# Patient Record
Sex: FEMALE | Race: WHITE
[De-identification: names, ages, dates, MRNs, and addresses within clinical notes are randomized per-mention and may not be internally consistent; named-entity substitution may affect disease eponyms.]

---

## 2022-05-24 ENCOUNTER — HOSPITAL ENCOUNTER (OUTPATIENT)
Dept: HOSPITAL 95 - LAB | Age: 87
Discharge: HOME | End: 2022-05-24
Attending: PHYSICIAN ASSISTANT
Payer: MEDICARE

## 2022-05-24 DIAGNOSIS — I10: Primary | ICD-10-CM

## 2022-05-24 LAB
ANION GAP SERPL CALCULATED.4IONS-SCNC: 6 MMOL/L (ref 6–16)
BUN SERPL-MCNC: 31 MG/DL (ref 8–24)
CALCIUM SERPL-MCNC: 9.4 MG/DL (ref 8.5–10.1)
CHLORIDE SERPL-SCNC: 100 MMOL/L (ref 98–108)
CO2 SERPL-SCNC: 31 MMOL/L (ref 21–32)
CREAT SERPL-MCNC: 0.84 MG/DL (ref 0.4–1)
GLUCOSE SERPL-MCNC: 101 MG/DL (ref 70–99)
POTASSIUM SERPL-SCNC: 3.9 MMOL/L (ref 3.5–5.5)
SODIUM SERPL-SCNC: 137 MMOL/L (ref 136–145)

## 2023-05-22 ENCOUNTER — HOSPITAL ENCOUNTER (INPATIENT)
Dept: HOSPITAL 95 - ER | Age: 88
LOS: 8 days | Discharge: HOME HEALTH | DRG: 100 | End: 2023-05-30
Attending: HOSPITALIST | Admitting: HOSPITALIST
Payer: COMMERCIAL

## 2023-05-22 VITALS — HEIGHT: 62 IN | WEIGHT: 111.55 LBS | BODY MASS INDEX: 20.53 KG/M2

## 2023-05-22 DIAGNOSIS — F17.210: ICD-10-CM

## 2023-05-22 DIAGNOSIS — Z66: ICD-10-CM

## 2023-05-22 DIAGNOSIS — F32.9: ICD-10-CM

## 2023-05-22 DIAGNOSIS — M81.0: ICD-10-CM

## 2023-05-22 DIAGNOSIS — M54.9: ICD-10-CM

## 2023-05-22 DIAGNOSIS — I65.21: ICD-10-CM

## 2023-05-22 DIAGNOSIS — G93.89: ICD-10-CM

## 2023-05-22 DIAGNOSIS — J96.90: ICD-10-CM

## 2023-05-22 DIAGNOSIS — D72.829: ICD-10-CM

## 2023-05-22 DIAGNOSIS — I67.9: ICD-10-CM

## 2023-05-22 DIAGNOSIS — R56.9: Primary | ICD-10-CM

## 2023-05-22 DIAGNOSIS — I65.02: ICD-10-CM

## 2023-05-22 DIAGNOSIS — I10: ICD-10-CM

## 2023-05-22 DIAGNOSIS — G89.4: ICD-10-CM

## 2023-05-22 DIAGNOSIS — F10.20: ICD-10-CM

## 2023-05-22 DIAGNOSIS — Z51.5: ICD-10-CM

## 2023-05-22 DIAGNOSIS — F11.11: ICD-10-CM

## 2023-05-22 DIAGNOSIS — Z79.899: ICD-10-CM

## 2023-05-22 DIAGNOSIS — F01.50: ICD-10-CM

## 2023-05-22 LAB
ALBUMIN SERPL BCP-MCNC: 4 G/DL (ref 3.4–5)
ALBUMIN/GLOB SERPL: 1.1 {RATIO} (ref 0.8–1.8)
ALT SERPL W P-5'-P-CCNC: 24 U/L (ref 12–78)
ANION GAP SERPL CALCULATED.4IONS-SCNC: 10 MMOL/L (ref 6–16)
AST SERPL W P-5'-P-CCNC: 33 U/L (ref 12–37)
BASOPHILS # BLD: 0.2 K/MM3 (ref 0–0.23)
BASOPHILS NFR BLD: 1 % (ref 0–2)
BILIRUB SERPL-MCNC: 0.5 MG/DL (ref 0.1–1)
BUN SERPL-MCNC: 14 MG/DL (ref 8–24)
CALCIUM SERPL-MCNC: 9.9 MG/DL (ref 8.5–10.1)
CANNABINOIDS UR QL: DETECTED
CHLORIDE SERPL-SCNC: 104 MMOL/L (ref 98–108)
CO2 SERPL-SCNC: 23 MMOL/L (ref 21–32)
CREAT SERPL-MCNC: 0.87 MG/DL (ref 0.4–1)
DEPRECATED RDW RBC AUTO: 42.6 FL (ref 35.1–46.3)
EOSINOPHIL # BLD: 0 K/MM3 (ref 0–0.68)
EOSINOPHIL NFR BLD: 0 % (ref 0–6)
ERYTHROCYTE [DISTWIDTH] IN BLOOD BY AUTOMATED COUNT: 12.2 % (ref 11.7–14.2)
GLOBULIN SER CALC-MCNC: 3.6 G/DL (ref 2.2–4)
GLUCOSE SERPL-MCNC: 140 MG/DL (ref 70–99)
HCT VFR BLD AUTO: 45.5 % (ref 33–51)
HGB BLD-MCNC: 14.7 G/DL (ref 11.5–16)
KETONES UR STRIP-MCNC: (no result) MG/DL
LYMPHOCYTES # BLD: 15.36 K/MM3 (ref 0.84–5.2)
LYMPHOCYTES NFR BLD: 73 % (ref 21–46)
MCHC RBC AUTO-ENTMCNC: 32.3 G/DL (ref 31.5–36.5)
MCV RBC AUTO: 95 FL (ref 80–100)
MONOCYTES # BLD: 0.83 K/MM3 (ref 0.16–1.47)
MONOCYTES NFR BLD: 4 % (ref 4–13)
NEUTS SEG # BLD MANUAL: 4.36 K/MM3 (ref 1.96–9.15)
NEUTS SEG NFR BLD MANUAL: 21 % (ref 41–73)
NRBC # BLD AUTO: 0 K/MM3 (ref 0–0.02)
NRBC BLD AUTO-RTO: 0 /100 WBC (ref 0–0.2)
PH BLDV: 7.4 [PH] (ref 7.34–7.37)
PLATELET # BLD AUTO: 336 K/MM3 (ref 150–400)
POTASSIUM SERPL-SCNC: 4.4 MMOL/L (ref 3.5–5.5)
PROT SERPL-MCNC: 7.6 G/DL (ref 6.4–8.2)
PROT UR STRIP-MCNC: (no result) MG/DL
SODIUM SERPL-SCNC: 137 MMOL/L (ref 136–145)
SP GR SPEC: 1.02 (ref 1–1.02)
TOTAL CELLS COUNTED BLD: 100
URN SPEC COLLECT METH UR: (no result)
UROBILINOGEN UR STRIP-MCNC: (no result) MG/DL
VARIANT LYMPHS NFR BLD MANUAL: 1 % (ref 0–0)

## 2023-05-22 PROCEDURE — 5A1945Z RESPIRATORY VENTILATION, 24-96 CONSECUTIVE HOURS: ICD-10-PCS | Performed by: HOSPITALIST

## 2023-05-22 PROCEDURE — A9270 NON-COVERED ITEM OR SERVICE: HCPCS

## 2023-05-22 PROCEDURE — 0T9B70Z DRAINAGE OF BLADDER WITH DRAINAGE DEVICE, VIA NATURAL OR ARTIFICIAL OPENING: ICD-10-PCS | Performed by: HOSPITALIST

## 2023-05-22 PROCEDURE — C9113 INJ PANTOPRAZOLE SODIUM, VIA: HCPCS

## 2023-05-22 PROCEDURE — 0DH67UZ INSERTION OF FEEDING DEVICE INTO STOMACH, VIA NATURAL OR ARTIFICIAL OPENING: ICD-10-PCS

## 2023-05-22 PROCEDURE — 0BH17EZ INSERTION OF ENDOTRACHEAL AIRWAY INTO TRACHEA, VIA NATURAL OR ARTIFICIAL OPENING: ICD-10-PCS

## 2023-05-23 VITALS — SYSTOLIC BLOOD PRESSURE: 122 MMHG | DIASTOLIC BLOOD PRESSURE: 68 MMHG

## 2023-05-23 VITALS — SYSTOLIC BLOOD PRESSURE: 151 MMHG | DIASTOLIC BLOOD PRESSURE: 68 MMHG

## 2023-05-23 VITALS — DIASTOLIC BLOOD PRESSURE: 86 MMHG | SYSTOLIC BLOOD PRESSURE: 142 MMHG

## 2023-05-23 VITALS — SYSTOLIC BLOOD PRESSURE: 172 MMHG | DIASTOLIC BLOOD PRESSURE: 88 MMHG

## 2023-05-23 VITALS — SYSTOLIC BLOOD PRESSURE: 148 MMHG | DIASTOLIC BLOOD PRESSURE: 70 MMHG

## 2023-05-23 VITALS — DIASTOLIC BLOOD PRESSURE: 118 MMHG | SYSTOLIC BLOOD PRESSURE: 143 MMHG

## 2023-05-23 VITALS — DIASTOLIC BLOOD PRESSURE: 62 MMHG | SYSTOLIC BLOOD PRESSURE: 154 MMHG

## 2023-05-23 VITALS — SYSTOLIC BLOOD PRESSURE: 135 MMHG | DIASTOLIC BLOOD PRESSURE: 70 MMHG

## 2023-05-23 VITALS — SYSTOLIC BLOOD PRESSURE: 133 MMHG | DIASTOLIC BLOOD PRESSURE: 82 MMHG

## 2023-05-23 VITALS — SYSTOLIC BLOOD PRESSURE: 121 MMHG | DIASTOLIC BLOOD PRESSURE: 61 MMHG

## 2023-05-23 VITALS — DIASTOLIC BLOOD PRESSURE: 73 MMHG | SYSTOLIC BLOOD PRESSURE: 151 MMHG

## 2023-05-23 VITALS — SYSTOLIC BLOOD PRESSURE: 134 MMHG | DIASTOLIC BLOOD PRESSURE: 87 MMHG

## 2023-05-23 VITALS — SYSTOLIC BLOOD PRESSURE: 154 MMHG | DIASTOLIC BLOOD PRESSURE: 62 MMHG

## 2023-05-23 VITALS — SYSTOLIC BLOOD PRESSURE: 147 MMHG | DIASTOLIC BLOOD PRESSURE: 74 MMHG

## 2023-05-23 VITALS — DIASTOLIC BLOOD PRESSURE: 64 MMHG | SYSTOLIC BLOOD PRESSURE: 137 MMHG

## 2023-05-23 VITALS — DIASTOLIC BLOOD PRESSURE: 64 MMHG | SYSTOLIC BLOOD PRESSURE: 151 MMHG

## 2023-05-23 VITALS — DIASTOLIC BLOOD PRESSURE: 70 MMHG | SYSTOLIC BLOOD PRESSURE: 102 MMHG

## 2023-05-23 VITALS — SYSTOLIC BLOOD PRESSURE: 113 MMHG | DIASTOLIC BLOOD PRESSURE: 68 MMHG

## 2023-05-23 VITALS — SYSTOLIC BLOOD PRESSURE: 149 MMHG | DIASTOLIC BLOOD PRESSURE: 88 MMHG

## 2023-05-23 VITALS — SYSTOLIC BLOOD PRESSURE: 160 MMHG | DIASTOLIC BLOOD PRESSURE: 75 MMHG

## 2023-05-23 VITALS — DIASTOLIC BLOOD PRESSURE: 57 MMHG | SYSTOLIC BLOOD PRESSURE: 140 MMHG

## 2023-05-23 VITALS — DIASTOLIC BLOOD PRESSURE: 81 MMHG | SYSTOLIC BLOOD PRESSURE: 199 MMHG

## 2023-05-23 VITALS — DIASTOLIC BLOOD PRESSURE: 77 MMHG | SYSTOLIC BLOOD PRESSURE: 140 MMHG

## 2023-05-23 VITALS — SYSTOLIC BLOOD PRESSURE: 124 MMHG | DIASTOLIC BLOOD PRESSURE: 71 MMHG

## 2023-05-23 VITALS — DIASTOLIC BLOOD PRESSURE: 75 MMHG | SYSTOLIC BLOOD PRESSURE: 220 MMHG

## 2023-05-23 VITALS — DIASTOLIC BLOOD PRESSURE: 72 MMHG | SYSTOLIC BLOOD PRESSURE: 160 MMHG

## 2023-05-23 VITALS — DIASTOLIC BLOOD PRESSURE: 80 MMHG | SYSTOLIC BLOOD PRESSURE: 134 MMHG

## 2023-05-23 VITALS — SYSTOLIC BLOOD PRESSURE: 130 MMHG | DIASTOLIC BLOOD PRESSURE: 55 MMHG

## 2023-05-23 VITALS — DIASTOLIC BLOOD PRESSURE: 69 MMHG | SYSTOLIC BLOOD PRESSURE: 142 MMHG

## 2023-05-23 VITALS — SYSTOLIC BLOOD PRESSURE: 153 MMHG | DIASTOLIC BLOOD PRESSURE: 67 MMHG

## 2023-05-23 VITALS — DIASTOLIC BLOOD PRESSURE: 84 MMHG | SYSTOLIC BLOOD PRESSURE: 146 MMHG

## 2023-05-23 VITALS — DIASTOLIC BLOOD PRESSURE: 69 MMHG | SYSTOLIC BLOOD PRESSURE: 150 MMHG

## 2023-05-23 VITALS — SYSTOLIC BLOOD PRESSURE: 220 MMHG | DIASTOLIC BLOOD PRESSURE: 75 MMHG

## 2023-05-23 VITALS — DIASTOLIC BLOOD PRESSURE: 73 MMHG | SYSTOLIC BLOOD PRESSURE: 149 MMHG

## 2023-05-23 VITALS — SYSTOLIC BLOOD PRESSURE: 119 MMHG | DIASTOLIC BLOOD PRESSURE: 102 MMHG

## 2023-05-23 VITALS — SYSTOLIC BLOOD PRESSURE: 143 MMHG | DIASTOLIC BLOOD PRESSURE: 96 MMHG

## 2023-05-23 VITALS — DIASTOLIC BLOOD PRESSURE: 86 MMHG | SYSTOLIC BLOOD PRESSURE: 155 MMHG

## 2023-05-23 VITALS — DIASTOLIC BLOOD PRESSURE: 60 MMHG | SYSTOLIC BLOOD PRESSURE: 109 MMHG

## 2023-05-23 VITALS — SYSTOLIC BLOOD PRESSURE: 205 MMHG | DIASTOLIC BLOOD PRESSURE: 74 MMHG

## 2023-05-23 VITALS — DIASTOLIC BLOOD PRESSURE: 83 MMHG | SYSTOLIC BLOOD PRESSURE: 154 MMHG

## 2023-05-23 VITALS — DIASTOLIC BLOOD PRESSURE: 65 MMHG | SYSTOLIC BLOOD PRESSURE: 126 MMHG

## 2023-05-23 VITALS — SYSTOLIC BLOOD PRESSURE: 149 MMHG | DIASTOLIC BLOOD PRESSURE: 86 MMHG

## 2023-05-23 VITALS — SYSTOLIC BLOOD PRESSURE: 129 MMHG | DIASTOLIC BLOOD PRESSURE: 64 MMHG

## 2023-05-23 VITALS — SYSTOLIC BLOOD PRESSURE: 121 MMHG | DIASTOLIC BLOOD PRESSURE: 63 MMHG

## 2023-05-23 VITALS — SYSTOLIC BLOOD PRESSURE: 112 MMHG | DIASTOLIC BLOOD PRESSURE: 66 MMHG

## 2023-05-23 VITALS — DIASTOLIC BLOOD PRESSURE: 63 MMHG | SYSTOLIC BLOOD PRESSURE: 124 MMHG

## 2023-05-23 VITALS — SYSTOLIC BLOOD PRESSURE: 141 MMHG | DIASTOLIC BLOOD PRESSURE: 62 MMHG

## 2023-05-23 VITALS — DIASTOLIC BLOOD PRESSURE: 89 MMHG | SYSTOLIC BLOOD PRESSURE: 143 MMHG

## 2023-05-23 VITALS — SYSTOLIC BLOOD PRESSURE: 142 MMHG | DIASTOLIC BLOOD PRESSURE: 58 MMHG

## 2023-05-23 VITALS — SYSTOLIC BLOOD PRESSURE: 142 MMHG | DIASTOLIC BLOOD PRESSURE: 78 MMHG

## 2023-05-23 VITALS — DIASTOLIC BLOOD PRESSURE: 82 MMHG | SYSTOLIC BLOOD PRESSURE: 166 MMHG

## 2023-05-23 VITALS — DIASTOLIC BLOOD PRESSURE: 70 MMHG | SYSTOLIC BLOOD PRESSURE: 115 MMHG

## 2023-05-23 VITALS — SYSTOLIC BLOOD PRESSURE: 137 MMHG | DIASTOLIC BLOOD PRESSURE: 76 MMHG

## 2023-05-23 VITALS — SYSTOLIC BLOOD PRESSURE: 117 MMHG | DIASTOLIC BLOOD PRESSURE: 66 MMHG

## 2023-05-23 VITALS — DIASTOLIC BLOOD PRESSURE: 88 MMHG | SYSTOLIC BLOOD PRESSURE: 149 MMHG

## 2023-05-23 VITALS — SYSTOLIC BLOOD PRESSURE: 185 MMHG | DIASTOLIC BLOOD PRESSURE: 75 MMHG

## 2023-05-23 VITALS — DIASTOLIC BLOOD PRESSURE: 102 MMHG | SYSTOLIC BLOOD PRESSURE: 119 MMHG

## 2023-05-23 VITALS — DIASTOLIC BLOOD PRESSURE: 62 MMHG | SYSTOLIC BLOOD PRESSURE: 141 MMHG

## 2023-05-23 VITALS — SYSTOLIC BLOOD PRESSURE: 156 MMHG | DIASTOLIC BLOOD PRESSURE: 96 MMHG

## 2023-05-23 VITALS — SYSTOLIC BLOOD PRESSURE: 139 MMHG | DIASTOLIC BLOOD PRESSURE: 84 MMHG

## 2023-05-23 VITALS — SYSTOLIC BLOOD PRESSURE: 126 MMHG | DIASTOLIC BLOOD PRESSURE: 59 MMHG

## 2023-05-23 VITALS — SYSTOLIC BLOOD PRESSURE: 137 MMHG | DIASTOLIC BLOOD PRESSURE: 55 MMHG

## 2023-05-23 VITALS — DIASTOLIC BLOOD PRESSURE: 62 MMHG | SYSTOLIC BLOOD PRESSURE: 147 MMHG

## 2023-05-23 VITALS — DIASTOLIC BLOOD PRESSURE: 83 MMHG | SYSTOLIC BLOOD PRESSURE: 134 MMHG

## 2023-05-23 VITALS — DIASTOLIC BLOOD PRESSURE: 66 MMHG | SYSTOLIC BLOOD PRESSURE: 211 MMHG

## 2023-05-23 VITALS — SYSTOLIC BLOOD PRESSURE: 113 MMHG | DIASTOLIC BLOOD PRESSURE: 63 MMHG

## 2023-05-23 VITALS — DIASTOLIC BLOOD PRESSURE: 67 MMHG | SYSTOLIC BLOOD PRESSURE: 121 MMHG

## 2023-05-23 VITALS — DIASTOLIC BLOOD PRESSURE: 63 MMHG | SYSTOLIC BLOOD PRESSURE: 168 MMHG

## 2023-05-23 VITALS — DIASTOLIC BLOOD PRESSURE: 71 MMHG | SYSTOLIC BLOOD PRESSURE: 149 MMHG

## 2023-05-23 VITALS — DIASTOLIC BLOOD PRESSURE: 73 MMHG | SYSTOLIC BLOOD PRESSURE: 133 MMHG

## 2023-05-23 VITALS — DIASTOLIC BLOOD PRESSURE: 67 MMHG | SYSTOLIC BLOOD PRESSURE: 143 MMHG

## 2023-05-23 VITALS — DIASTOLIC BLOOD PRESSURE: 74 MMHG | SYSTOLIC BLOOD PRESSURE: 121 MMHG

## 2023-05-23 VITALS — DIASTOLIC BLOOD PRESSURE: 51 MMHG | SYSTOLIC BLOOD PRESSURE: 97 MMHG

## 2023-05-23 VITALS — SYSTOLIC BLOOD PRESSURE: 136 MMHG | DIASTOLIC BLOOD PRESSURE: 105 MMHG

## 2023-05-23 VITALS — DIASTOLIC BLOOD PRESSURE: 63 MMHG | SYSTOLIC BLOOD PRESSURE: 148 MMHG

## 2023-05-23 VITALS — SYSTOLIC BLOOD PRESSURE: 112 MMHG | DIASTOLIC BLOOD PRESSURE: 54 MMHG

## 2023-05-23 VITALS — DIASTOLIC BLOOD PRESSURE: 74 MMHG | SYSTOLIC BLOOD PRESSURE: 212 MMHG

## 2023-05-23 VITALS — DIASTOLIC BLOOD PRESSURE: 64 MMHG | SYSTOLIC BLOOD PRESSURE: 156 MMHG

## 2023-05-23 VITALS — SYSTOLIC BLOOD PRESSURE: 177 MMHG | DIASTOLIC BLOOD PRESSURE: 66 MMHG

## 2023-05-23 VITALS — DIASTOLIC BLOOD PRESSURE: 78 MMHG | SYSTOLIC BLOOD PRESSURE: 121 MMHG

## 2023-05-23 VITALS — SYSTOLIC BLOOD PRESSURE: 131 MMHG | DIASTOLIC BLOOD PRESSURE: 67 MMHG

## 2023-05-23 LAB
ALBUMIN SERPL BCP-MCNC: 3.7 G/DL (ref 3.4–5)
ALBUMIN/GLOB SERPL: 1.2 {RATIO} (ref 0.8–1.8)
ALT SERPL W P-5'-P-CCNC: 19 U/L (ref 12–78)
ANION GAP SERPL CALCULATED.4IONS-SCNC: 10 MMOL/L (ref 6–16)
AST SERPL W P-5'-P-CCNC: 29 U/L (ref 12–37)
BASOPHILS # BLD: 0.2 K/MM3 (ref 0–0.23)
BASOPHILS NFR BLD: 1 % (ref 0–2)
BILIRUB SERPL-MCNC: 0.5 MG/DL (ref 0.1–1)
BUN SERPL-MCNC: 15 MG/DL (ref 8–24)
CALCIUM SERPL-MCNC: 9.8 MG/DL (ref 8.5–10.1)
CHLORIDE SERPL-SCNC: 102 MMOL/L (ref 98–108)
CO2 SERPL-SCNC: 26 MMOL/L (ref 21–32)
CREAT SERPL-MCNC: 0.8 MG/DL (ref 0.4–1)
DEPRECATED RDW RBC AUTO: 41.1 FL (ref 35.1–46.3)
EOSINOPHIL # BLD: 0 K/MM3 (ref 0–0.68)
EOSINOPHIL NFR BLD: 0 % (ref 0–6)
ERYTHROCYTE [DISTWIDTH] IN BLOOD BY AUTOMATED COUNT: 12.2 % (ref 11.7–14.2)
GLOBULIN SER CALC-MCNC: 3.2 G/DL (ref 2.2–4)
GLUCOSE SERPL-MCNC: 118 MG/DL (ref 70–99)
HCT VFR BLD AUTO: 41.8 % (ref 33–51)
HGB BLD-MCNC: 14.1 G/DL (ref 11.5–16)
LYMPHOCYTES # BLD: 11.9 K/MM3 (ref 0.84–5.2)
LYMPHOCYTES NFR BLD: 57 % (ref 21–46)
MCHC RBC AUTO-ENTMCNC: 33.7 G/DL (ref 31.5–36.5)
MCV RBC AUTO: 92 FL (ref 80–100)
MONOCYTES # BLD: 1.46 K/MM3 (ref 0.16–1.47)
MONOCYTES NFR BLD: 7 % (ref 4–13)
NEUTS BAND NFR BLD MANUAL: 1 % (ref 0–8)
NEUTS SEG # BLD MANUAL: 7.3 K/MM3 (ref 1.96–9.15)
NEUTS SEG NFR BLD MANUAL: 34 % (ref 41–73)
NRBC # BLD AUTO: 0 K/MM3 (ref 0–0.02)
NRBC BLD AUTO-RTO: 0 /100 WBC (ref 0–0.2)
PLATELET # BLD AUTO: 337 K/MM3 (ref 150–400)
POTASSIUM SERPL-SCNC: 3.7 MMOL/L (ref 3.5–5.5)
PROT SERPL-MCNC: 6.9 G/DL (ref 6.4–8.2)
SODIUM SERPL-SCNC: 138 MMOL/L (ref 136–145)
TOTAL CELLS COUNTED BLD: 100

## 2023-05-23 NOTE — NUR
SHIFT SUMMARY
 
PT REMAINS INTUBATED AND SEDATED. NO CHANGES TO VENT SETTINGS. PROPOFOL
CONTINUES @ 30MCG'S. EEG COMPLETED THIS AFTERNOON, AWAITING RESULTS. NO
SEIZURES NOTED TODAY. TEMP PROBE NOLAN DRAINING NOEMÍ URINE. 100ML/HR OF NS
STARTED X 1L. FAMILY AT BEDSIDE THIS AFTERNOON, NOTIFIED THIS NURSE OF PTS
ETOH USAGE, STATED PT HAS NOT HAD A DRINK IN 2 DAYS PRIOR TO SEIZURE. NO OTHER
ACUTE CHANGES TODAY.

## 2023-05-23 NOTE — NUR
PROPOFOL INFILTRATE TO LEFT UPPER ARM. NOTHING OBTAINED WITH DRAWING BACK,
FLUSHED WITH NS. LEFT AC SITE DC'D. BP CUFF MOVED TO RIGHT WRIST. WHILE
PROPOFOL OFF PATIENT ACTIVE IN BED PULLING AWAY FROM PAIN, BRING KNEES UP,
CONTINUES TO NOT OPEN EYES.

## 2023-05-23 NOTE — NUR
PATIENT REMAINS INTUBATED AND SEDATED PROPOFOL 30 MCG, PATIENT KEEPING EYES
CLOSED, PUPILS PINPOINT. WITH SLIGHT STIMULI, MOVING ALL EXTREMITIES RIGHT ARM
MORE THAN LEFT. PATIENT CAN STRAIGHTEN LEGS, BUT LIKES TO BEND KNEES UP TIGHT.
ETT IN PLACE WITH VENT ACVC+ 16, , PEEP 5, FIO2 25% OG IN PLACE AND
CLAMPED. NOLAN DRAINING SMALL AMT OF NOEMÍ URINE.

## 2023-05-23 NOTE — NUR
SUMMARY
PATIENT REMAINS INTUBATED AND SEDATED WITH PROPOFOL 30 MCG. ETT IN PLACE WITH
VENT ACVC+ 16, , PEEP 5, FIO2 25% . PATIENT CONTINUES TO NOT OPEN HER
EYES, NOT FOLLOWING DIRECTIONS. TABARES RIGHT MORE THAN LEFT, INCREASED MOVEMENT
WITH STIMULI. PULLING KNEES UP FREQUENTLY. OG IN PLACE AND CLAMPED.
NICARDIPINE DRIP ON FOR ONLY SHORT TIME TONIGHT. /67.

## 2023-05-23 NOTE — NUR
SHIFT ASSESSMENT
 
PT INTUBATED AND SEDATED, VENT SETTINGS AC-16/350/5/25% c SATS >95%. PROPOFOL
@ 30MCG'S. BL SOFT WRIST RESTRAINTS ON.  PT GIVEN SEDATION VACATION MID
MORNING , DURING THAT TIME PT NOT FOLLOWING COMMANDS BUT MOVING ALL
EXTREMITIES. COUGH AND GAG PRESENT. PUPILS REMAIN PINPOINT. OGT CLAMPED. TEMP
PROBE NOLAN DRAINING YELLOW URINE. NO BM. EEG SCHEDULED FOR THIS AFTERNOON.
WILL  MONITOR CLOSELY.

## 2023-05-24 VITALS — SYSTOLIC BLOOD PRESSURE: 171 MMHG | DIASTOLIC BLOOD PRESSURE: 64 MMHG

## 2023-05-24 VITALS — DIASTOLIC BLOOD PRESSURE: 73 MMHG | SYSTOLIC BLOOD PRESSURE: 184 MMHG

## 2023-05-24 VITALS — SYSTOLIC BLOOD PRESSURE: 129 MMHG | DIASTOLIC BLOOD PRESSURE: 68 MMHG

## 2023-05-24 VITALS — DIASTOLIC BLOOD PRESSURE: 76 MMHG | SYSTOLIC BLOOD PRESSURE: 133 MMHG

## 2023-05-24 VITALS — SYSTOLIC BLOOD PRESSURE: 169 MMHG | DIASTOLIC BLOOD PRESSURE: 68 MMHG

## 2023-05-24 VITALS — SYSTOLIC BLOOD PRESSURE: 184 MMHG | DIASTOLIC BLOOD PRESSURE: 84 MMHG

## 2023-05-24 VITALS — DIASTOLIC BLOOD PRESSURE: 90 MMHG | SYSTOLIC BLOOD PRESSURE: 161 MMHG

## 2023-05-24 VITALS — SYSTOLIC BLOOD PRESSURE: 172 MMHG | DIASTOLIC BLOOD PRESSURE: 74 MMHG

## 2023-05-24 VITALS — SYSTOLIC BLOOD PRESSURE: 104 MMHG | DIASTOLIC BLOOD PRESSURE: 70 MMHG

## 2023-05-24 VITALS — SYSTOLIC BLOOD PRESSURE: 177 MMHG | DIASTOLIC BLOOD PRESSURE: 69 MMHG

## 2023-05-24 VITALS — DIASTOLIC BLOOD PRESSURE: 69 MMHG | SYSTOLIC BLOOD PRESSURE: 167 MMHG

## 2023-05-24 VITALS — DIASTOLIC BLOOD PRESSURE: 62 MMHG | SYSTOLIC BLOOD PRESSURE: 160 MMHG

## 2023-05-24 VITALS — SYSTOLIC BLOOD PRESSURE: 138 MMHG | DIASTOLIC BLOOD PRESSURE: 105 MMHG

## 2023-05-24 VITALS — DIASTOLIC BLOOD PRESSURE: 67 MMHG | SYSTOLIC BLOOD PRESSURE: 168 MMHG

## 2023-05-24 VITALS — SYSTOLIC BLOOD PRESSURE: 146 MMHG | DIASTOLIC BLOOD PRESSURE: 71 MMHG

## 2023-05-24 VITALS — SYSTOLIC BLOOD PRESSURE: 169 MMHG | DIASTOLIC BLOOD PRESSURE: 64 MMHG

## 2023-05-24 VITALS — DIASTOLIC BLOOD PRESSURE: 56 MMHG | SYSTOLIC BLOOD PRESSURE: 165 MMHG

## 2023-05-24 LAB
ALBUMIN SERPL BCP-MCNC: 3.3 G/DL (ref 3.4–5)
ALBUMIN/GLOB SERPL: 1.1 {RATIO} (ref 0.8–1.8)
ALT SERPL W P-5'-P-CCNC: 27 U/L (ref 12–78)
ANION GAP SERPL CALCULATED.4IONS-SCNC: 10 MMOL/L (ref 6–16)
AST SERPL W P-5'-P-CCNC: 67 U/L (ref 12–37)
BASOPHILS # BLD AUTO: 0.07 K/MM3 (ref 0–0.23)
BASOPHILS NFR BLD AUTO: 0 % (ref 0–2)
BILIRUB SERPL-MCNC: 0.9 MG/DL (ref 0.1–1)
BUN SERPL-MCNC: 18 MG/DL (ref 8–24)
CALCIUM SERPL-MCNC: 9.1 MG/DL (ref 8.5–10.1)
CHLORIDE SERPL-SCNC: 107 MMOL/L (ref 98–108)
CO2 SERPL-SCNC: 24 MMOL/L (ref 21–32)
CREAT SERPL-MCNC: 0.74 MG/DL (ref 0.4–1)
DEPRECATED RDW RBC AUTO: 40.1 FL (ref 35.1–46.3)
EOSINOPHIL # BLD AUTO: 0.06 K/MM3 (ref 0–0.68)
EOSINOPHIL NFR BLD AUTO: 0 % (ref 0–6)
ERYTHROCYTE [DISTWIDTH] IN BLOOD BY AUTOMATED COUNT: 12.2 % (ref 11.7–14.2)
GLOBULIN SER CALC-MCNC: 3 G/DL (ref 2.2–4)
GLUCOSE SERPL-MCNC: 74 MG/DL (ref 70–99)
HCT VFR BLD AUTO: 39.4 % (ref 33–51)
HGB BLD-MCNC: 13.5 G/DL (ref 11.5–16)
IMM GRANULOCYTES # BLD AUTO: 0.07 K/MM3 (ref 0–0.1)
IMM GRANULOCYTES NFR BLD AUTO: 0 % (ref 0–1)
LYMPHOCYTES # BLD AUTO: 6.64 K/MM3 (ref 0.84–5.2)
LYMPHOCYTES NFR BLD AUTO: 37 % (ref 21–46)
MCHC RBC AUTO-ENTMCNC: 34.3 G/DL (ref 31.5–36.5)
MCV RBC AUTO: 90 FL (ref 80–100)
MONOCYTES # BLD AUTO: 2.33 K/MM3 (ref 0.16–1.47)
MONOCYTES NFR BLD AUTO: 13 % (ref 4–13)
NEUTROPHILS # BLD AUTO: 8.63 K/MM3 (ref 1.96–9.15)
NEUTROPHILS NFR BLD AUTO: 49 % (ref 41–73)
NRBC # BLD AUTO: 0 K/MM3 (ref 0–0.02)
NRBC BLD AUTO-RTO: 0 /100 WBC (ref 0–0.2)
PLATELET # BLD AUTO: 245 K/MM3 (ref 150–400)
POTASSIUM SERPL-SCNC: 3.7 MMOL/L (ref 3.5–5.5)
PROT SERPL-MCNC: 6.3 G/DL (ref 6.4–8.2)
SODIUM SERPL-SCNC: 141 MMOL/L (ref 136–145)

## 2023-05-24 NOTE — NUR
SUMMARY
PATIENT REMAINS INTUBATED AND SEDATED. PROPOFOL 30 MCG CONTINUES, MOVES ALL
EXTREMITIES RIGHT ARM MORE THAN LEFT ARM, LIKES TO BEND HER KNEES UP. THIS
MORNING PATIENT OPENING BOTH EYES TO STIMULI, BUT CONTINUES TO NOT FOLLOW
DIRECTIONS. ETT REMAINS IN PLACE WITH VENT ACVC+ 16, , PEEP 5, FIO2 25%
SUCTIONING THIN CLEAR SECRETIONS VIA ETT AND ORAL. OG IN PLACE REMAINS
CLAMPED. MEDICATED ONCE WITH DILAUDID DURING THE NIGHT FOR PAIN. NO ISSUES
WITH HYPERTENSION T/O NIGHT.
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
## 2023-05-24 NOTE — NUR
SHIFT SUMMARY
PT HAS DONE WELL THIS SHIFT S/P EXTUBATION. PT REMAINS ON 3L O2 NC. PT
RESTLESS AND KICKING LEGS OUT OF THE SIDE OF THE BED MOST OF THE SHIFT. PT
UNABLE TO BE REDIRECTED. PT ABLE TO SQUEEZE HANDS UPON COMMAND PRIOR TO
EXTUBATION, PT NOW DOES NOT FOLLOW ANY COMMANDS. PT IS CONFUSED AND PICKING AT
LINES/TUBES. PT COMPLAINED OF BACK PAIN, PT MED PER EMAR THIS AFTERNOON. PT
HAS BEEN RESTING QUIETLY SINCE RECIEVING PAIN MEDS. NS INFUSING TKO. NOLAN
TEMP PROBE REMAINS IN PLACE WITH DARK YELLOW OUTPUT NOTED. VITAL SIGNS STABLE.
PT DAUGHTER AT BEDSIDE AT THIS TIME SPEAKING WITH PALLIATIVE CARE RN. WILL
CONTINUE TO MONITOR AND REPORT OFF TO ONCOMING RN.

## 2023-05-24 NOTE — NUR
Initial palliative care consult:
 
Betty is an 87 year old with a history of dementia, HTN, osteoporosis,
ETOH use and chronic pain with chronic narcotic use.  Betty was admitted
on 5/22/23 with new onset seizures.
 
Attended the ICU IDT meeting earlier today and received an update.  At that
time, Betty was still intubated.  No family at the bedside.  This
afternoon, Betty was successfully extubated.  Attempted to visit with her
in the room this afternoon.  Introduced myself and asked her a couple simple
yes/no questions.  She stared at me and did not give any verbal response.
Spoke with nursing who reports pt's dtr has not yet been in to visit her
today.  Requested nursing to contact PC if dtr comes to visit.  Would like to
discuss advanced care planning with pt's dtr as Betty is unable to
participate in any meaningful conversation at this time.   Spoke with CM who
reports she has attempted to reach out to dtr, Imelda, twice today with
messages but so far has not gotten a return call.  Chart reviewed.  PC to
follow up with dtr when available.

## 2023-05-25 VITALS — DIASTOLIC BLOOD PRESSURE: 84 MMHG | SYSTOLIC BLOOD PRESSURE: 160 MMHG

## 2023-05-25 VITALS — DIASTOLIC BLOOD PRESSURE: 62 MMHG | SYSTOLIC BLOOD PRESSURE: 158 MMHG

## 2023-05-25 VITALS — DIASTOLIC BLOOD PRESSURE: 68 MMHG | SYSTOLIC BLOOD PRESSURE: 162 MMHG

## 2023-05-25 VITALS — DIASTOLIC BLOOD PRESSURE: 64 MMHG | SYSTOLIC BLOOD PRESSURE: 167 MMHG

## 2023-05-25 VITALS — SYSTOLIC BLOOD PRESSURE: 156 MMHG | DIASTOLIC BLOOD PRESSURE: 126 MMHG

## 2023-05-25 VITALS — DIASTOLIC BLOOD PRESSURE: 71 MMHG | SYSTOLIC BLOOD PRESSURE: 169 MMHG

## 2023-05-25 VITALS — DIASTOLIC BLOOD PRESSURE: 126 MMHG | SYSTOLIC BLOOD PRESSURE: 153 MMHG

## 2023-05-25 VITALS — SYSTOLIC BLOOD PRESSURE: 176 MMHG | DIASTOLIC BLOOD PRESSURE: 74 MMHG

## 2023-05-25 VITALS — SYSTOLIC BLOOD PRESSURE: 174 MMHG | DIASTOLIC BLOOD PRESSURE: 66 MMHG

## 2023-05-25 VITALS — SYSTOLIC BLOOD PRESSURE: 173 MMHG | DIASTOLIC BLOOD PRESSURE: 67 MMHG

## 2023-05-25 LAB
ALBUMIN SERPL BCP-MCNC: 3.2 G/DL (ref 3.4–5)
ALBUMIN/GLOB SERPL: 1.1 {RATIO} (ref 0.8–1.8)
ALT SERPL W P-5'-P-CCNC: 30 U/L (ref 12–78)
ANION GAP SERPL CALCULATED.4IONS-SCNC: 9 MMOL/L (ref 6–16)
AST SERPL W P-5'-P-CCNC: 77 U/L (ref 12–37)
BILIRUB SERPL-MCNC: 0.7 MG/DL (ref 0.1–1)
BUN SERPL-MCNC: 18 MG/DL (ref 8–24)
CALCIUM SERPL-MCNC: 9.2 MG/DL (ref 8.5–10.1)
CHLORIDE SERPL-SCNC: 109 MMOL/L (ref 98–108)
CO2 SERPL-SCNC: 24 MMOL/L (ref 21–32)
CREAT SERPL-MCNC: 0.67 MG/DL (ref 0.4–1)
DEPRECATED RDW RBC AUTO: 41.1 FL (ref 35.1–46.3)
ERYTHROCYTE [DISTWIDTH] IN BLOOD BY AUTOMATED COUNT: 12.3 % (ref 11.7–14.2)
GLOBULIN SER CALC-MCNC: 2.9 G/DL (ref 2.2–4)
GLUCOSE SERPL-MCNC: 76 MG/DL (ref 70–99)
HCT VFR BLD AUTO: 38.5 % (ref 33–51)
HGB BLD-MCNC: 13.1 G/DL (ref 11.5–16)
MCHC RBC AUTO-ENTMCNC: 34 G/DL (ref 31.5–36.5)
MCV RBC AUTO: 92 FL (ref 80–100)
NRBC # BLD AUTO: 0 K/MM3 (ref 0–0.02)
NRBC BLD AUTO-RTO: 0 /100 WBC (ref 0–0.2)
PLATELET # BLD AUTO: 268 K/MM3 (ref 150–400)
POTASSIUM SERPL-SCNC: 3.3 MMOL/L (ref 3.5–5.5)
PROT SERPL-MCNC: 6.1 G/DL (ref 6.4–8.2)
SODIUM SERPL-SCNC: 142 MMOL/L (ref 136–145)

## 2023-05-25 NOTE — NUR
SHIFT SUMMARY:
 
ASSUMED CARE OF PT UPON HER TRANSFER FROM ICU AT 1527. ALERT TO SELF ONLY, NOT
DIRECTABLE, CANNOT UNDERSTAND VERBAL DIRECTIONS. NO RESTRAINTS IN USE. ORDER
FOR NO IV ACCESS NEEDED IS PLACE. ON ROOM AIR, LUNGS CLEAR, NO COUGH. SEIZURE
AND ASPIRATION PRECAUTIONS IN PLACE. DENIED PAIN, N/V. SCD'S AT BEDSIDE, BUT
PT IS GETTING OOB SO FREQUENTLY THAT THESE WOULD POSE A SIGNIFICANT FALL
HAZARD SO THEY ARE NOT ON. REQUESTED MEDICATION TO HELP CALM HER DOWN.

## 2023-05-25 NOTE — NUR
SHIFT SUMMARY:
 
NO ACUTE CHANGES THROUGHOUT THE NIGHT. PT WAS ABLE TO GET GOOD SLEEP UNTIL
ABOUT 0200 THEN PT WOKE UP AND WAS CONFUSED AND NOT COOPERATIVE WITH CARE. PT
CONTINUOUSLY ATTEMPTING TO GET OUT OF BED, PULLING AT CATHETER AND THEN PULLED
OUT PIV IN RAC. AT THIS TIME, 0400, PT PLACED IN BILATERAL SWR. PT REMAINS AMS
AND DOES NOT FOLLOW DIRECTIONS. PT ATTTEMPTS TO KICK AT STAFF MEMBERS WHEN
ATTEMPTING PT CARE. PT EDUCATED ABOUT THE NEED TO STOP THIS BEHAVIOR BUT DOES
NOT COMPREHEND. PT REMAINS ON NC @ 2LPM; SPO2 96<. SR TO ST ON MONITOR WITH
-160'S. NOLAN CATH REMAINS IN PLACE AND DRAINING TO GRAVITY; NOEMÍ
URINE, AND DECREASED OUTPUT. PT REFUSED ORAL CARE THIS SHIFT. STATUS CHANGE TO
PCU WITH TELE EARLY THIS MORNING. BED LOWERED, CALL LIGHT IN PLACE, WILL
CONTINUE TO MONITOR UNTIL ONCOMING RN ARRIVES.

## 2023-05-25 NOTE — NUR
[facility]:
 
Care assumed at 0700hr. Patient alert, but only oriented to self. Confused to
place, time, date, reason for admission. Bilateral soft wrist restraints in
place at shift change. Restraints removed and tele-sitter initiated, with goal
to prevent patient from pulling out her IV and climbing out of bed.
Re-directable by [facility] staff this, but not re-directable by tele-sitter thus
far this morning. Pollack cath removed shortly after shift change. 2-person
assist to BEC, then to recliner. Peripheral IV x1 patent and intact. ST eval
ordered for this morning. Will continue to monitor.

## 2023-05-26 VITALS — SYSTOLIC BLOOD PRESSURE: 152 MMHG | DIASTOLIC BLOOD PRESSURE: 76 MMHG

## 2023-05-26 VITALS — SYSTOLIC BLOOD PRESSURE: 186 MMHG | DIASTOLIC BLOOD PRESSURE: 88 MMHG

## 2023-05-26 VITALS — DIASTOLIC BLOOD PRESSURE: 75 MMHG | SYSTOLIC BLOOD PRESSURE: 159 MMHG

## 2023-05-26 LAB
ANION GAP SERPL CALCULATED.4IONS-SCNC: 8 MMOL/L (ref 6–16)
BUN SERPL-MCNC: 10 MG/DL (ref 8–24)
CALCIUM SERPL-MCNC: 9.3 MG/DL (ref 8.5–10.1)
CHLORIDE SERPL-SCNC: 103 MMOL/L (ref 98–108)
CO2 SERPL-SCNC: 29 MMOL/L (ref 21–32)
CREAT SERPL-MCNC: 0.53 MG/DL (ref 0.4–1)
GLUCOSE SERPL-MCNC: 84 MG/DL (ref 70–99)
POTASSIUM SERPL-SCNC: 3 MMOL/L (ref 3.5–5.5)
SODIUM SERPL-SCNC: 140 MMOL/L (ref 136–145)
TSH SERPL DL<=0.005 MIU/L-ACNC: 1.62 UIU/ML (ref 0.36–4.8)

## 2023-05-26 NOTE — NUR
SHIFT SUMMARY
PT LAYING IN BED DURING BEDSIDE REPORT, PT RESTING- PER REPORT SEROQUEL AND BP
TO BE GIVEN FROM 1700- PT CONFUSED AND ATTEMPTING TO GET OF THE BED SEVERAL
TIMES T/O SHIFT- UNABLE TO DIRECT PT WHEN UP AND AMBULATING- ASSISTED PT
TO BSC- PT BOTH CONTINENT AND INCONTIENT OF URINE- PT CONFUSED T/O NIGHT AND
KEPT ON ASKING THIS NURSE TO GO IN HER ROOM AND GET HER BELONGINGS- BED TO LOW
POSITION, CALL LIGHT WITHIN REACH, BED ALARM IN PLACE

## 2023-05-26 NOTE — NUR
SHIFT SUMMARY
NO ACUTE CHANGES THIS SHIFT. REMAINS CONFUSED, COGNITIVELY UNABLE TO BE
DIRECTED FOR SIMPLE TASKS. ORIENTED TO SELF ONLY. WILL ATTEMPT TO GET OOB, BED
ALARM ON. SAT IN THE CHAIR FOR APPROX AN HOUR, CHAIR ALARM WAS ON. PT NOT
EATING THOUGH IS A FEEDER, UNABLE TO COGNITIVELY UNDERSTAND TO EAT. MEDS ARE
CRUSHED AND PUT IN PUDDING. WILL SWALLOW THE PUDDING W/MEDS. NO IV SITE PER
MD. IS CONTINENT/INCONTINENT OF URINE & STOOL. DID USE BSC ONCE TODAY. PLAN
IS FOR PLACEMENT UPON DC.

## 2023-05-27 VITALS — SYSTOLIC BLOOD PRESSURE: 92 MMHG | DIASTOLIC BLOOD PRESSURE: 52 MMHG

## 2023-05-27 VITALS — DIASTOLIC BLOOD PRESSURE: 65 MMHG | SYSTOLIC BLOOD PRESSURE: 147 MMHG

## 2023-05-27 VITALS — SYSTOLIC BLOOD PRESSURE: 135 MMHG | DIASTOLIC BLOOD PRESSURE: 66 MMHG

## 2023-05-27 VITALS — SYSTOLIC BLOOD PRESSURE: 147 MMHG | DIASTOLIC BLOOD PRESSURE: 69 MMHG

## 2023-05-27 VITALS — DIASTOLIC BLOOD PRESSURE: 52 MMHG | SYSTOLIC BLOOD PRESSURE: 86 MMHG

## 2023-05-27 NOTE — NUR
SHIFT SUMMARY
NO ACUTE CHANGES DURING SHIFT. PT ALERT TO SELF, CONFUSED. PT VERY LETHARGIC
TODAY, POSSIBLY D/T MEDICATIONS. MEDICATION SCHEDULE CHANGED BY MD. PT REMAINS
ON 2L NC. PT PENDING PLACEMENT ONCE MEDICALLY CLEARED. WILL CONTINUE TO
MONITOR. CALL LIGHT WITHIN REACH.

## 2023-05-27 NOTE — NUR
SHIFT SUMMARY
PT LAYING IN BED ASLEEP DURING BEDSIDE REPORT- NO S/S DISTRESS- PT AWAKE AND
TOOK SCHEDULED HS MEDICATIONS- PT UP TO BSC AND URINATED WITHOUT PROBLEMS- PT
CONFUSED AND HALLUCINATING HAVING CONVERSATION WITH SOMEONE IN THE ROOM-
APPLIED 2L OF O2 VIA NC FOR SATS WERE 87%- RECHECK OF O2 SATS ON 2L= 93% BED
LOW POSITION, CALL LIGHT WITHIN REACH, BED ALARM IN PLACE

## 2023-05-28 VITALS — SYSTOLIC BLOOD PRESSURE: 133 MMHG | DIASTOLIC BLOOD PRESSURE: 62 MMHG

## 2023-05-28 VITALS — SYSTOLIC BLOOD PRESSURE: 113 MMHG | DIASTOLIC BLOOD PRESSURE: 64 MMHG

## 2023-05-28 VITALS — SYSTOLIC BLOOD PRESSURE: 120 MMHG | DIASTOLIC BLOOD PRESSURE: 60 MMHG

## 2023-05-28 VITALS — DIASTOLIC BLOOD PRESSURE: 73 MMHG | SYSTOLIC BLOOD PRESSURE: 134 MMHG

## 2023-05-28 LAB
ALBUMIN SERPL BCP-MCNC: 2.9 G/DL (ref 3.4–5)
ALBUMIN/GLOB SERPL: 0.9 {RATIO} (ref 0.8–1.8)
ALT SERPL W P-5'-P-CCNC: 39 U/L (ref 12–78)
ANION GAP SERPL CALCULATED.4IONS-SCNC: 6 MMOL/L (ref 6–16)
AST SERPL W P-5'-P-CCNC: 50 U/L (ref 12–37)
BILIRUB SERPL-MCNC: 0.3 MG/DL (ref 0.1–1)
BUN SERPL-MCNC: 33 MG/DL (ref 8–24)
CALCIUM SERPL-MCNC: 9.7 MG/DL (ref 8.5–10.1)
CHLORIDE SERPL-SCNC: 107 MMOL/L (ref 98–108)
CO2 SERPL-SCNC: 31 MMOL/L (ref 21–32)
CREAT SERPL-MCNC: 0.8 MG/DL (ref 0.4–1)
GLOBULIN SER CALC-MCNC: 3.2 G/DL (ref 2.2–4)
GLUCOSE SERPL-MCNC: 137 MG/DL (ref 70–99)
POTASSIUM SERPL-SCNC: 4.1 MMOL/L (ref 3.5–5.5)
PROT SERPL-MCNC: 6.1 G/DL (ref 6.4–8.2)
SODIUM SERPL-SCNC: 144 MMOL/L (ref 136–145)

## 2023-05-28 NOTE — NUR
SHIFT SUMMARY
PT LAYING IN BED ASLEEP DURING BEDSIDE ROUNDS- PT TOOK SCHEDULED TRAZADONE AT
BEGINNING OF SHIFT PER ORDER, ENCOURAGED PT TO DRINK FLUIDS AND PT TOOK
SCHEDULED HS MEDS WITHOUT PROBLEMS, PT HALLUCINATING AND TALKING TO IMAGINARY
PEOPLE T/O SHIFT - PT REMOVED SOILED BRIEF - PT ABLE TO FOLLOW DIRECTIONS TO
ROLL BACK IN FORTH IN BED WHEN CLEANING HER BLANCHE AREA AND APPLYING A NEW
BRIEF-BED ALARM IN PLACE, BED LOW POSITION, CALL LIGHT WITHIN REACH

## 2023-05-28 NOTE — NUR
SHIFT SUMMARY/TRANSFER NOTE
 
PT TRANSFERRED FROM ROOM 345 ON MEDICAL FLOOR.  REPORT RECEIVED FROM EDDIE JOLLEY.  PT HAS HAD NO COMPLAINTS SINCE THE TRANSFER AND IS CURRENTLY SLEEPING.
WILL REPORT TO ONCOMING NURSE.

## 2023-05-29 VITALS — DIASTOLIC BLOOD PRESSURE: 67 MMHG | SYSTOLIC BLOOD PRESSURE: 165 MMHG

## 2023-05-29 VITALS — SYSTOLIC BLOOD PRESSURE: 137 MMHG | DIASTOLIC BLOOD PRESSURE: 64 MMHG

## 2023-05-29 LAB
ANION GAP SERPL CALCULATED.4IONS-SCNC: 6 MMOL/L (ref 6–16)
BASOPHILS # BLD AUTO: 0.07 K/MM3 (ref 0–0.23)
BASOPHILS NFR BLD AUTO: 1 % (ref 0–2)
BUN SERPL-MCNC: 31 MG/DL (ref 8–24)
CALCIUM SERPL-MCNC: 9.8 MG/DL (ref 8.5–10.1)
CHLORIDE SERPL-SCNC: 106 MMOL/L (ref 98–108)
CO2 SERPL-SCNC: 30 MMOL/L (ref 21–32)
CREAT SERPL-MCNC: 0.67 MG/DL (ref 0.4–1)
DEPRECATED RDW RBC AUTO: 42.1 FL (ref 35.1–46.3)
EOSINOPHIL # BLD AUTO: 0.15 K/MM3 (ref 0–0.68)
EOSINOPHIL NFR BLD AUTO: 1 % (ref 0–6)
ERYTHROCYTE [DISTWIDTH] IN BLOOD BY AUTOMATED COUNT: 12.2 % (ref 11.7–14.2)
GLUCOSE SERPL-MCNC: 113 MG/DL (ref 70–99)
HCT VFR BLD AUTO: 43.7 % (ref 33–51)
HGB BLD-MCNC: 14.3 G/DL (ref 11.5–16)
IMM GRANULOCYTES # BLD AUTO: 0.04 K/MM3 (ref 0–0.1)
IMM GRANULOCYTES NFR BLD AUTO: 0 % (ref 0–1)
LYMPHOCYTES # BLD AUTO: 7.07 K/MM3 (ref 0.84–5.2)
LYMPHOCYTES NFR BLD AUTO: 52 % (ref 21–46)
MCHC RBC AUTO-ENTMCNC: 32.7 G/DL (ref 31.5–36.5)
MCV RBC AUTO: 94 FL (ref 80–100)
MONOCYTES # BLD AUTO: 1.1 K/MM3 (ref 0.16–1.47)
MONOCYTES NFR BLD AUTO: 8 % (ref 4–13)
NEUTROPHILS # BLD AUTO: 5.06 K/MM3 (ref 1.96–9.15)
NEUTROPHILS NFR BLD AUTO: 38 % (ref 41–73)
NRBC # BLD AUTO: 0 K/MM3 (ref 0–0.02)
NRBC BLD AUTO-RTO: 0 /100 WBC (ref 0–0.2)
PLATELET # BLD AUTO: 299 K/MM3 (ref 150–400)
POTASSIUM SERPL-SCNC: 3.9 MMOL/L (ref 3.5–5.5)
SODIUM SERPL-SCNC: 142 MMOL/L (ref 136–145)

## 2023-05-29 NOTE — NUR
SPOKE WITH ST
SPOKE WITH ST ABOUT PREVIOUS ST EVALUATION FROM LAST WEEK. PLAN TO START
CRUSHING MEDS AND PLACE IN APPLESAUCE TO HELP WITH THE PTS ABILITY TO SWALLOW.

## 2023-05-29 NOTE — NUR
CONCERN FOR SAFE SWALLOW
THIS RN REPORTED TO DR. SOLORZANO THAT PT WAS HAVING TROUBLE SWALLOWING HER PILLS
THIS AM AND THERE WAS CONCERN SHE WAS ASPIRATING THEM BY THE SOUND OF HER
MOIST COUGH AFTER ATTEMPTING TO SWALLOW THEM. ORDER FOR ST EVAL PLACED.

## 2023-05-29 NOTE — NUR
SHIFT SUMMARY
PT ASKS TO GO HOME OFTEN OR WHEN SHE WILL BE LEAVING. PT REORIENTED WITH
MINIMAL UNDERSTANDING. CARE MANAGEMENT WORKING ON A SAFE DC PLAN. VS REVIEWED.
PT INCONT T/O SHIFT. UP IN CHAIR THIS AFTERNOON WATCHING TV. POOR APPETITE,
BUT DOES EAT SOME OF EACH MEAL WITH ASSISTANCE. MEDS TO ME CRUSHED IN
APPLESAUCE AS PT STRUGGLED WITH SWALLOWING THEM WHOLE THIS AM. NO OTHER ACUTE
CHANGES IN ASSESSMENT AT THIS TIME. CALL LIGHT IN REACH.

## 2023-05-30 VITALS — DIASTOLIC BLOOD PRESSURE: 68 MMHG | SYSTOLIC BLOOD PRESSURE: 132 MMHG

## 2023-05-30 NOTE — NUR
Spiritual Care Visit.
Pt. is soundly resting in a recliner. Family members are present and welcome
my visit. On recent visitng attempts the Pt. has been unable to respond so I
facilitated a life review of the Pt. and family. Consider matters of damon and
belief. Daughter displays evidence of trust and hope. Family verbalized
gratitude for the spiritual care visit.

## 2023-05-30 NOTE — NUR
DISCHARGE
PT DISCHARGED AT 1619. PT NEW WHEELCHAIR WAS DELIVERED BY [facility] PRIOR TO
DC. PT GIVEN BEDBATH THIS SHIFT. PT DAUGHTER EDUCATED ON DC INSTRUCTIONS AND
NEW MEDS. ENCOURAGED ABOUT THE IMPORTANCE OF PICKING THEM UP AND STARTING THEM
TONIGHT, ESPECIALLY HER KEPPRA. FAMILY STATES THEY UNDERSTAND AND HAVE NO
FURTHER QUESTIONS. PT WHEELED OUT BY AIDE AND DRIVEN HOME BY DAUGHTER.

## 2023-08-21 ENCOUNTER — HOSPITAL ENCOUNTER (OUTPATIENT)
Dept: HOSPITAL 95 - LAB SHORT | Age: 88
End: 2023-08-21
Attending: FAMILY MEDICINE
Payer: COMMERCIAL

## 2023-08-21 DIAGNOSIS — R41.82: Primary | ICD-10-CM

## 2023-09-13 ENCOUNTER — HOSPITAL ENCOUNTER (INPATIENT)
Dept: HOSPITAL 95 - ER | Age: 88
LOS: 5 days | Discharge: HOME HEALTH | DRG: 481 | End: 2023-09-18
Attending: HOSPITALIST | Admitting: HOSPITALIST
Payer: COMMERCIAL

## 2023-09-13 VITALS — BODY MASS INDEX: 16.26 KG/M2 | WEIGHT: 101.19 LBS | HEIGHT: 66 IN

## 2023-09-13 VITALS — DIASTOLIC BLOOD PRESSURE: 74 MMHG | SYSTOLIC BLOOD PRESSURE: 171 MMHG
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
## 2023-09-13 DIAGNOSIS — Z86.73: ICD-10-CM

## 2023-09-13 DIAGNOSIS — F10.10: ICD-10-CM

## 2023-09-13 DIAGNOSIS — R56.9: ICD-10-CM

## 2023-09-13 DIAGNOSIS — S72.141A: Primary | ICD-10-CM

## 2023-09-13 DIAGNOSIS — F01.50: ICD-10-CM

## 2023-09-13 DIAGNOSIS — Z66: ICD-10-CM

## 2023-09-13 DIAGNOSIS — L76.32: ICD-10-CM
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
## 2023-09-13 DIAGNOSIS — F32.A: ICD-10-CM

## 2023-09-13 DIAGNOSIS — Z79.899: ICD-10-CM

## 2023-09-13 DIAGNOSIS — Z98.1: ICD-10-CM

## 2023-09-13 DIAGNOSIS — R55: ICD-10-CM

## 2023-09-13 DIAGNOSIS — I10: ICD-10-CM

## 2023-09-13 DIAGNOSIS — R09.02: ICD-10-CM

## 2023-09-13 DIAGNOSIS — D64.9: ICD-10-CM

## 2023-09-13 LAB
ALBUMIN SERPL BCP-MCNC: 3.4 G/DL (ref 3.4–5)
ALBUMIN/GLOB SERPL: 1.3 {RATIO} (ref 0.8–1.8)
ALT SERPL W P-5'-P-CCNC: 21 U/L (ref 12–78)
ANION GAP SERPL CALCULATED.4IONS-SCNC: 3 MMOL/L (ref 6–16)
AST SERPL W P-5'-P-CCNC: 22 U/L (ref 12–37)
BASOPHILS # BLD AUTO: 0.03 K/MM3 (ref 0–0.23)
BASOPHILS NFR BLD AUTO: 0 % (ref 0–2)
BILIRUB SERPL-MCNC: 0.2 MG/DL (ref 0.1–1)
BUN SERPL-MCNC: 18 MG/DL (ref 8–24)
CALCIUM SERPL-MCNC: 9.4 MG/DL (ref 8.5–10.1)
CHLORIDE SERPL-SCNC: 107 MMOL/L (ref 98–108)
CO2 SERPL-SCNC: 27 MMOL/L (ref 21–32)
CREAT SERPL-MCNC: 0.68 MG/DL (ref 0.4–1)
DEPRECATED RDW RBC AUTO: 42.3 FL (ref 35.1–46.3)
EOSINOPHIL # BLD AUTO: 0.07 K/MM3 (ref 0–0.68)
EOSINOPHIL NFR BLD AUTO: 1 % (ref 0–6)
ERYTHROCYTE [DISTWIDTH] IN BLOOD BY AUTOMATED COUNT: 12.5 % (ref 11.7–14.2)
GLOBULIN SER CALC-MCNC: 2.6 G/DL (ref 2.2–4)
GLUCOSE SERPL-MCNC: 114 MG/DL (ref 70–99)
HCT VFR BLD AUTO: 32.7 % (ref 33–51)
HGB BLD-MCNC: 11 G/DL (ref 11.5–16)
IMM GRANULOCYTES # BLD AUTO: 0.02 K/MM3 (ref 0–0.1)
IMM GRANULOCYTES NFR BLD AUTO: 0 % (ref 0–1)
LYMPHOCYTES # BLD AUTO: 4.83 K/MM3 (ref 0.84–5.2)
LYMPHOCYTES NFR BLD AUTO: 47 % (ref 21–46)
MAGNESIUM SERPL-MCNC: 1.8 MG/DL (ref 1.6–2.4)
MCHC RBC AUTO-ENTMCNC: 33.6 G/DL (ref 31.5–36.5)
MCV RBC AUTO: 91 FL (ref 80–100)
MONOCYTES # BLD AUTO: 0.62 K/MM3 (ref 0.16–1.47)
MONOCYTES NFR BLD AUTO: 6 % (ref 4–13)
NEUTROPHILS # BLD AUTO: 4.61 K/MM3 (ref 1.96–9.15)
NEUTROPHILS NFR BLD AUTO: 45 % (ref 41–73)
NRBC # BLD AUTO: 0 K/MM3 (ref 0–0.02)
NRBC BLD AUTO-RTO: 0 /100 WBC (ref 0–0.2)
PHOSPHATE SERPL-MCNC: 2.7 MG/DL (ref 2.5–4.9)
PLATELET # BLD AUTO: 268 K/MM3 (ref 150–400)
POTASSIUM SERPL-SCNC: 3.9 MMOL/L (ref 3.5–5.5)
PROT SERPL-MCNC: 6 G/DL (ref 6.4–8.2)
PROTHROMBIN TIME: 10.3 SEC (ref 9.7–11.5)
SODIUM SERPL-SCNC: 137 MMOL/L (ref 136–145)

## 2023-09-13 PROCEDURE — P9016 RBC LEUKOCYTES REDUCED: HCPCS

## 2023-09-13 PROCEDURE — C1713 ANCHOR/SCREW BN/BN,TIS/BN: HCPCS

## 2023-09-13 PROCEDURE — A9270 NON-COVERED ITEM OR SERVICE: HCPCS

## 2023-09-14 VITALS — DIASTOLIC BLOOD PRESSURE: 73 MMHG | SYSTOLIC BLOOD PRESSURE: 156 MMHG

## 2023-09-14 VITALS — DIASTOLIC BLOOD PRESSURE: 79 MMHG | SYSTOLIC BLOOD PRESSURE: 159 MMHG

## 2023-09-14 VITALS — DIASTOLIC BLOOD PRESSURE: 143 MMHG | SYSTOLIC BLOOD PRESSURE: 177 MMHG

## 2023-09-14 VITALS — DIASTOLIC BLOOD PRESSURE: 60 MMHG | SYSTOLIC BLOOD PRESSURE: 139 MMHG

## 2023-09-14 VITALS — DIASTOLIC BLOOD PRESSURE: 66 MMHG | SYSTOLIC BLOOD PRESSURE: 116 MMHG

## 2023-09-14 VITALS — SYSTOLIC BLOOD PRESSURE: 124 MMHG | DIASTOLIC BLOOD PRESSURE: 72 MMHG

## 2023-09-14 VITALS — DIASTOLIC BLOOD PRESSURE: 88 MMHG | SYSTOLIC BLOOD PRESSURE: 142 MMHG

## 2023-09-14 VITALS — DIASTOLIC BLOOD PRESSURE: 71 MMHG | SYSTOLIC BLOOD PRESSURE: 177 MMHG

## 2023-09-14 VITALS — DIASTOLIC BLOOD PRESSURE: 69 MMHG | SYSTOLIC BLOOD PRESSURE: 171 MMHG

## 2023-09-14 VITALS — SYSTOLIC BLOOD PRESSURE: 119 MMHG | DIASTOLIC BLOOD PRESSURE: 73 MMHG

## 2023-09-14 VITALS — SYSTOLIC BLOOD PRESSURE: 184 MMHG | DIASTOLIC BLOOD PRESSURE: 80 MMHG

## 2023-09-14 VITALS — SYSTOLIC BLOOD PRESSURE: 171 MMHG | DIASTOLIC BLOOD PRESSURE: 70 MMHG

## 2023-09-14 VITALS — DIASTOLIC BLOOD PRESSURE: 51 MMHG | SYSTOLIC BLOOD PRESSURE: 122 MMHG

## 2023-09-14 VITALS — DIASTOLIC BLOOD PRESSURE: 58 MMHG | SYSTOLIC BLOOD PRESSURE: 149 MMHG

## 2023-09-14 VITALS — DIASTOLIC BLOOD PRESSURE: 61 MMHG | SYSTOLIC BLOOD PRESSURE: 150 MMHG

## 2023-09-14 VITALS — SYSTOLIC BLOOD PRESSURE: 170 MMHG | DIASTOLIC BLOOD PRESSURE: 83 MMHG

## 2023-09-14 LAB
ALBUMIN SERPL BCP-MCNC: 3.1 G/DL (ref 3.4–5)
ALBUMIN/GLOB SERPL: 1.3 {RATIO} (ref 0.8–1.8)
ALT SERPL W P-5'-P-CCNC: 21 U/L (ref 12–78)
ANION GAP SERPL CALCULATED.4IONS-SCNC: 2 MMOL/L (ref 6–16)
AST SERPL W P-5'-P-CCNC: 25 U/L (ref 12–37)
BASOPHILS # BLD AUTO: 0.02 K/MM3 (ref 0–0.23)
BASOPHILS NFR BLD AUTO: 0 % (ref 0–2)
BILIRUB SERPL-MCNC: 0.5 MG/DL (ref 0.1–1)
BUN SERPL-MCNC: 20 MG/DL (ref 8–24)
CALCIUM SERPL-MCNC: 9.2 MG/DL (ref 8.5–10.1)
CHLORIDE SERPL-SCNC: 106 MMOL/L (ref 98–108)
CO2 SERPL-SCNC: 29 MMOL/L (ref 21–32)
CREAT SERPL-MCNC: 0.65 MG/DL (ref 0.4–1)
DEPRECATED RDW RBC AUTO: 40.7 FL (ref 35.1–46.3)
EOSINOPHIL # BLD AUTO: 0.01 K/MM3 (ref 0–0.68)
EOSINOPHIL NFR BLD AUTO: 0 % (ref 0–6)
ERYTHROCYTE [DISTWIDTH] IN BLOOD BY AUTOMATED COUNT: 12.4 % (ref 11.7–14.2)
GLOBULIN SER CALC-MCNC: 2.4 G/DL (ref 2.2–4)
GLUCOSE SERPL-MCNC: 133 MG/DL (ref 70–99)
HCT VFR BLD AUTO: 28.7 % (ref 33–51)
HGB BLD-MCNC: 9.6 G/DL (ref 11.5–16)
IMM GRANULOCYTES # BLD AUTO: 0.04 K/MM3 (ref 0–0.1)
IMM GRANULOCYTES NFR BLD AUTO: 0 % (ref 0–1)
LYMPHOCYTES # BLD AUTO: 3.62 K/MM3 (ref 0.84–5.2)
LYMPHOCYTES NFR BLD AUTO: 32 % (ref 21–46)
MCHC RBC AUTO-ENTMCNC: 33.4 G/DL (ref 31.5–36.5)
MCV RBC AUTO: 90 FL (ref 80–100)
MONOCYTES # BLD AUTO: 0.79 K/MM3 (ref 0.16–1.47)
MONOCYTES NFR BLD AUTO: 7 % (ref 4–13)
NEUTROPHILS # BLD AUTO: 6.93 K/MM3 (ref 1.96–9.15)
NEUTROPHILS NFR BLD AUTO: 61 % (ref 41–73)
NRBC # BLD AUTO: 0 K/MM3 (ref 0–0.02)
NRBC BLD AUTO-RTO: 0 /100 WBC (ref 0–0.2)
PLATELET # BLD AUTO: 230 K/MM3 (ref 150–400)
POTASSIUM SERPL-SCNC: 3.6 MMOL/L (ref 3.5–5.5)
PROT SERPL-MCNC: 5.5 G/DL (ref 6.4–8.2)
SODIUM SERPL-SCNC: 137 MMOL/L (ref 136–145)

## 2023-09-14 PROCEDURE — 0QS634Z REPOSITION RIGHT UPPER FEMUR WITH INTERNAL FIXATION DEVICE, PERCUTANEOUS APPROACH: ICD-10-PCS | Performed by: ORTHOPAEDIC SURGERY

## 2023-09-14 NOTE — NUR
SURGERY CONSENT
OBTAINED PHONE CONSENT FROM PATIENTS NEXT OF KIN, VERIFIED WITH SECOND RN HER
CONSENT TO SURGERY, CONSENT FORM SIGNED AND PLACED IN HER HARD CHART.

## 2023-09-14 NOTE — NUR
SHIFT SUMMARY
S/P FALL, R FEMUR FX
PT ARRIVED TO UNIT AT 2330. TRANSFERRED FROM [facility] TO BED WITH 4 PERSON ASST.
PT REPORTS PAIN WHEN MOVING. ABLE TO MEDICATE PER EMAR. PT HAS ADVANCED
DEMENTIA, ABLE TO REDIRECT EASILY. VERY FORGETFUL, THINKS SHE CAN STILL WALK.
CALLS OUT FOR A PERSON NAMED "JONATHAN" DURING THE NIGHT. ALL HOME MEDS WERE
STARTED. TAKEN WITH APPLESAUCE AND SOME WATER. NPO AT 0030. PULLED IV OUT.
ATTEMPTED TO START NEW ONE, UNSUCESSFUL. AQUIRED SKIN TEAR AFTER PULLING IV
OUT, APPLIED PRESSURE, MEPILEX PLACED. BED ALARM ON. NO OTHER CONCERNS AT THIS
TIME. CALL LIGHT WITHIN REACH.

## 2023-09-14 NOTE — NUR
MEDICATIONS
 
PT WOULD NOT SWALLOW WHOLE MED IN [facility]. PT TOOK MEDICATION AND HANDED
THEM BACK TO ME. ONE MEDICATION FELL ON THE FLOOR, AFTER PT REMOVED IT FROM
HER MOUTH. [facility] ALL OTHER MEDICATIONS
CRUSHED IN APPLESAUCE AND PT TOLERATED SWALLOWING THEM.

## 2023-09-14 NOTE — NUR
SHIFT SUMMARY
POD0 R HIP FX REPAIR, ALERT BUT ONLY OREIENTED TO SELF, VERY DEMENTED BUT
PLEASANT, COOPERATIVE WITH CARE BUT REPEATS QUESTIONS REGULARLY, GAUZE AND
COMPRESSION TAPE TO R HIP AND THIGH FROM OR, PT WAKES TO VERBAL STIMULI BUT
WAS VERY SLEEPY/SOMNOLENT, SHE DENIES PAIN AT THIS TIME BUT HAS PAIN MEDS
AVAILABLE WHEN NEEDED. CALL LIGHT IN REACH, REPORT GIVEN TO JUSTO MORGAN.

## 2023-09-14 NOTE — NUR
Into sds via bed. Pt has dementia at baseline, but currently cooperative with
care. History, Chart, Medications and Allergies reviewed before start of
procedure.Patient confirms NPO status and agrees with scheduled surgery.
Lungs clear T/O to Auscultation, but diminished in the bases. Sats>90% on RA.
Pt MARCELLUS Segura to be contacted to obtaine consents for procedure.New PIV #20
started to left wrist.

## 2023-09-14 NOTE — NUR
Brief supportive visit this afternoon. Pt is pleasantly confused A&O to self
and family only. Pt reports mild generalized pain. Ended visit to allow Pt to
rest.
 
Spoke with Primary RN Adam and reviewed plan of care. Plan for Pt to have
surgery today. No immediate concerns.
 
Called and spoke with Pt's daughter Imelda. Imelda reports at baseline Pt as
assistive devices for ambulation but refuses to use them and uses wall and
furniture. Pt requires assistance with bathing, dressing, and experiences
intermittent incontinence. Pt at baseline is confused but on some days even
more confused. Brief review of plan of care. Imelda expresses appreciation
and is agreeable for continued visits and phone calls.
 
Palliative Care will remain available

## 2023-09-15 VITALS — SYSTOLIC BLOOD PRESSURE: 141 MMHG | DIASTOLIC BLOOD PRESSURE: 66 MMHG

## 2023-09-15 VITALS — DIASTOLIC BLOOD PRESSURE: 56 MMHG | SYSTOLIC BLOOD PRESSURE: 115 MMHG

## 2023-09-15 VITALS — SYSTOLIC BLOOD PRESSURE: 143 MMHG | DIASTOLIC BLOOD PRESSURE: 66 MMHG

## 2023-09-15 VITALS — SYSTOLIC BLOOD PRESSURE: 152 MMHG | DIASTOLIC BLOOD PRESSURE: 62 MMHG

## 2023-09-15 VITALS — DIASTOLIC BLOOD PRESSURE: 54 MMHG | SYSTOLIC BLOOD PRESSURE: 119 MMHG

## 2023-09-15 LAB
ANION GAP SERPL CALCULATED.4IONS-SCNC: 3 MMOL/L (ref 6–16)
BASOPHILS # BLD: 0 K/MM3 (ref 0–0.23)
BASOPHILS NFR BLD: 0 % (ref 0–2)
BUN SERPL-MCNC: 23 MG/DL (ref 8–24)
CALCIUM SERPL-MCNC: 8.9 MG/DL (ref 8.5–10.1)
CHLORIDE SERPL-SCNC: 108 MMOL/L (ref 98–108)
CO2 SERPL-SCNC: 28 MMOL/L (ref 21–32)
CREAT SERPL-MCNC: 0.75 MG/DL (ref 0.4–1)
DEPRECATED RDW RBC AUTO: 42.5 FL (ref 35.1–46.3)
EOSINOPHIL # BLD: 0 K/MM3 (ref 0–0.68)
EOSINOPHIL NFR BLD: 0 % (ref 0–6)
ERYTHROCYTE [DISTWIDTH] IN BLOOD BY AUTOMATED COUNT: 12.8 % (ref 11.7–14.2)
GLUCOSE SERPL-MCNC: 123 MG/DL (ref 70–99)
HCT VFR BLD AUTO: 23.8 % (ref 33–51)
HGB BLD-MCNC: 8 G/DL (ref 11.5–16)
LYMPHOCYTES # BLD: 5.79 K/MM3 (ref 0.84–5.2)
LYMPHOCYTES NFR BLD: 42 % (ref 21–46)
MAGNESIUM SERPL-MCNC: 1.7 MG/DL (ref 1.6–2.4)
MCHC RBC AUTO-ENTMCNC: 33.6 G/DL (ref 31.5–36.5)
MCV RBC AUTO: 91 FL (ref 80–100)
MONOCYTES # BLD: 0.41 K/MM3 (ref 0.16–1.47)
MONOCYTES NFR BLD: 3 % (ref 4–13)
NEUTS SEG # BLD MANUAL: 7.59 K/MM3 (ref 1.96–9.15)
NEUTS SEG NFR BLD MANUAL: 55 % (ref 41–73)
NRBC # BLD AUTO: 0 K/MM3 (ref 0–0.02)
NRBC BLD AUTO-RTO: 0 /100 WBC (ref 0–0.2)
PLATELET # BLD AUTO: 246 K/MM3 (ref 150–400)
POTASSIUM SERPL-SCNC: 4.1 MMOL/L (ref 3.5–5.5)
SODIUM SERPL-SCNC: 139 MMOL/L (ref 136–145)
TOTAL CELLS COUNTED BLD: 100

## 2023-09-15 NOTE — NUR
SHIFT SUMMARY
 
POD 1 R INTERMEDULLARY TROCHANTERIC HIP NAILING. GAUZE/PRESSURE TAPE x2 C/D/I.
HX SEVERE DEMENTIA, PT ONLY ORIENTED TO SELF, EASILY REDIRECTABLE. PT HAS BEEN
SLEEPY THROUGHOUT MOST OF THE SHIFT. PT INCONTINENT, ATTENS IN PLACE; PT
ATTEMPTS TO REMOVE ATTENS, ONE TOTAL SATURATION OF THE BED WITH URINE THIS
SHIFT. PT INDICATES PAIN WHEN TURNING IN BED FOR BLANCHE CARE. BEDREST AT THIS
TIME, AWAITING PT EVAL. PT WEANED OFF 02 NC OVERNIGHT, BI OX IN USE. PT NEEDED
MEDICATIONS CRUSHED IN APPLESAUCE, WHEN GIVEN WHOLE, THE PATIENT WOULD TAKE
THE PILL OUT OF HER MOUTH AND HAND IT BACK TO ME. BED IN LOWEST POSITION, BED
ALARM ON, WILL REPORT TO DAY RN.

## 2023-09-15 NOTE — NUR
SHIFT SUMMARY:
 
PT SITTING IN CHAIR MAJORITY OF SHIFT. AMBULATES WITH ONE ASSIST, FWW AND GAIT
BELT. MEDICATED X1 FOR PAIN AFTER USING COMMODE. PLANS FOR DC TO SNF WHEN BED
AVAILABLE. DAUGHTER AT BEDSIDE THIS AFTERNOON. NO ACUTE NEEDS OR CONCERNS AT
THIS TIME.

## 2023-09-15 NOTE — NUR
IV ACCESS
 
PT PULLED OUT HER IV. THIRD TIME SINCE ADMITTANCE. BLOOD SPOTTTING THROUGHOUT
BED, 1x1 AND COBAN PUT OVER SITE TO STOP BLEEDING. FULL LINEN CHANGE. WILL
ATTEMPT TO PLACE NEW VASCULAR ACCESS.

## 2023-09-15 NOTE — NUR
ASSUMED CARE:
 
PT LAYING IN BED, CALLING OUT TO STAFF. CONFUSED ABOUT WHERE SHE IS AND
REQUIRES REORIENTATION. CNA AT BEDSIDE AT THIS TIME. PT FREQUENTLY PULLS IVS
AND REMOVES O2. SATURATION CURRENTLY LOW TO MID 90S ON RA.

## 2023-09-15 NOTE — NUR
NIGHT RN SPOKE TO DR SALAS TO MAKE HIM AWARE OF LACK OF IV ACCESS.  STATES
PT NEEDS ABX DOSE. MESSAGE FOR PCU CHARGE RN TO ATTEMPT ACCESS.

## 2023-09-15 NOTE — NUR
Pt resting in recliner chair. Pt is pleasantly confused and denies pain at
this time. Pt engages in non sensical conversation. Offered brief supportive
visit.
 
Spoke with Primary RN Erna and discussed case. Therapy has not worked with Pt
yet.
 
Spoke with Dr Encinas and discussed case. It appears Pt's code status was
changed for surgery and was not switched back to DNR. Placed DNR order per V/O
from Dr Encinas.
 
Palliative Care will remain available

## 2023-09-15 NOTE — NUR
PT RETURNED FROM PACU WITH GAUZE AND PRESSURE TAPE TO RIGHT HIP IN TWO SITES.
2L O2 AT THIS TIME VIA NC, NSR ON TELE. DR QUIGLEY CAME TO CHECK IN ON PT. PT
DROWSY BUT ROUSES EASILY. NO ACUTE NEEDS OR CONCERNS AT THIS TIME.

## 2023-09-16 VITALS — SYSTOLIC BLOOD PRESSURE: 150 MMHG | DIASTOLIC BLOOD PRESSURE: 61 MMHG

## 2023-09-16 VITALS — DIASTOLIC BLOOD PRESSURE: 60 MMHG | SYSTOLIC BLOOD PRESSURE: 115 MMHG

## 2023-09-16 VITALS — SYSTOLIC BLOOD PRESSURE: 165 MMHG | DIASTOLIC BLOOD PRESSURE: 62 MMHG

## 2023-09-16 VITALS — DIASTOLIC BLOOD PRESSURE: 73 MMHG | SYSTOLIC BLOOD PRESSURE: 133 MMHG

## 2023-09-16 VITALS — DIASTOLIC BLOOD PRESSURE: 47 MMHG | SYSTOLIC BLOOD PRESSURE: 87 MMHG

## 2023-09-16 VITALS — SYSTOLIC BLOOD PRESSURE: 127 MMHG | DIASTOLIC BLOOD PRESSURE: 54 MMHG

## 2023-09-16 VITALS — SYSTOLIC BLOOD PRESSURE: 139 MMHG | DIASTOLIC BLOOD PRESSURE: 59 MMHG

## 2023-09-16 LAB
ANION GAP SERPL CALCULATED.4IONS-SCNC: 4 MMOL/L (ref 6–16)
BASOPHILS # BLD: 0 K/MM3 (ref 0–0.23)
BASOPHILS NFR BLD: 0 % (ref 0–2)
BUN SERPL-MCNC: 28 MG/DL (ref 8–24)
CALCIUM SERPL-MCNC: 8.9 MG/DL (ref 8.5–10.1)
CHLORIDE SERPL-SCNC: 103 MMOL/L (ref 98–108)
CO2 SERPL-SCNC: 28 MMOL/L (ref 21–32)
CREAT SERPL-MCNC: 0.8 MG/DL (ref 0.4–1)
DEPRECATED RDW RBC AUTO: 42.5 FL (ref 35.1–46.3)
EOSINOPHIL # BLD: 0.14 K/MM3 (ref 0–0.68)
EOSINOPHIL NFR BLD: 1 % (ref 0–6)
ERYTHROCYTE [DISTWIDTH] IN BLOOD BY AUTOMATED COUNT: 12.9 % (ref 11.7–14.2)
GLUCOSE SERPL-MCNC: 117 MG/DL (ref 70–99)
HCT VFR BLD AUTO: 21.8 % (ref 33–51)
HGB BLD-MCNC: 7.4 G/DL (ref 11.5–16)
LYMPHOCYTES # BLD: 5.79 K/MM3 (ref 0.84–5.2)
LYMPHOCYTES NFR BLD: 40 % (ref 21–46)
MCHC RBC AUTO-ENTMCNC: 33.9 G/DL (ref 31.5–36.5)
MCV RBC AUTO: 91 FL (ref 80–100)
MONOCYTES # BLD: 0.84 K/MM3 (ref 0.16–1.47)
MONOCYTES NFR BLD: 6 % (ref 4–13)
NEUTS SEG # BLD MANUAL: 7.35 K/MM3 (ref 1.96–9.15)
NEUTS SEG NFR BLD MANUAL: 52 % (ref 41–73)
NRBC # BLD AUTO: 0 K/MM3 (ref 0–0.02)
NRBC BLD AUTO-RTO: 0 /100 WBC (ref 0–0.2)
PLATELET # BLD AUTO: 237 K/MM3 (ref 150–400)
POTASSIUM SERPL-SCNC: 4 MMOL/L (ref 3.5–5.5)
SODIUM SERPL-SCNC: 135 MMOL/L (ref 136–145)
SP GR SPEC: 1.02 (ref 1–1.02)
TOTAL CELLS COUNTED BLD: 100
UROBILINOGEN UR STRIP-MCNC: (no result) MG/DL
VARIANT LYMPHS NFR BLD MANUAL: 1 % (ref 0–0)

## 2023-09-16 PROCEDURE — 30233N1 TRANSFUSION OF NONAUTOLOGOUS RED BLOOD CELLS INTO PERIPHERAL VEIN, PERCUTANEOUS APPROACH: ICD-10-PCS | Performed by: HOSPITALIST

## 2023-09-16 NOTE — NUR
SUMMARY
C/O PAIN ON R HIP ONLY WHEN UP AND MOVING, 2 PERSON ASSIST TO THE CHAIR AND
BSC, TOLERATED FAIRLY WELL WITH REPEATED CUEING, DOESN'T ALWAYS FOLLOW TOE
TOUCH WEIGHT BEARING PRECAUTIONS, PLEASANT AND COOPERATIVE, DENIES ANY SOB
TODAY, 96-98% ON RA THIS AFTERNOON, HAD 2 BM TODAY, NO ACUTE CHANGES THIS
SHIFT.

## 2023-09-16 NOTE — NUR
RAPID RESPONSE
RAPID RESPONSE CALLED DUE TO PATIENT LOOSING CONSCIOUSNESS ON THE BSC. PT WAS
UP OOB TO BS TO USE THE BATHROOM. PT SLUMPED TO THE LEFT SIDE AND SAID SHE
FELT AS IF SOMETHING WAS WRONG. I STOOD THE PATIENT UP USING THE GAIT BELT TO
ASSIST HER BACK TO BED AND SHE WENT LIMP, COMPLETELY UNRESPONSIVE. NOTED
THAT THE PT HAD SOILED HERSELF IN BED EVEN AFTER USING THE BSC. PT WAS
SAT DOWN ON THE BSC AND A PULSE CHECK WAS PERFORMED. PT WAS SPONTANEOUSLY
BREATHING.  OTHER NURSES ARRIVED TO THE ROOM AND THE PT WAS LIFTED INTO THE
BED AND A SET OF VITALS TAKEN. PT PLACED ON 2LVIA NC BY RESPIRATORY THERAPY.
PT BEGAN TO RESPOND TO STERNAL RUBS AND EVENTUALLY OPENED HER EYES AND BEGAN
TO CONVORSATE. HOSPITALIST ORDERED TELE AND LEFT AS THE PATIENT WAS STABLE. PT
NOW REMAINS IN BED, APPEARS LETHARGIC BUT RESPONDING APPROPRIATELY. NO
NARCOTICS HAVE BEEN ADMINISTERED AT THIS POINT T/O MY SHIFT. IV FLUIDS
INFUSING. PLAN TO CHECK VITALS AND MONITOR PATIENT, CALL HOSPITALIST WITH
FURTHER CONCERN

## 2023-09-16 NOTE — NUR
SHIFT SUMMARY;
 
NO ACUTE CHANGES OVERNIGHT. THE PT IS AXO X1, TO SELF ONLY. THE PT OFTEN CAN
NOT CARRY OUT A COMPLETE THOUGHT. THE PT IS A 2X MAX ASSIST W/ A FWW AND A
GAIT BELT. THE PT IS VERY RIGID WHEN ATTEMPTING TO STAND AND PIVOT HER FROM
THE BED TO THE BSC OR VICE VERSA. THE PT IS POD 2 FOR A R HIP FX REPAIR.
THERE IS GAUZE W/ PRESSURE TAPE DRESSING PRESENT ON THE R HIP.
THE DRESSING IS  C/D/I. THE PT HAS BEEN MEDICATED
ONCE FOR PAIN W/ GOOD RELIEF. THE
PT HAS BEEN RESTING IN BED FOR THE MAJORITY OF THE NIGHT. THE PT DENIES ANY
SOB, CHEST PAIN/PRESSURE OR N/V T/O THE NIGHT. CURRENTLY THE PT IS SLEEPING IN
BED WITH THE BED IN THE LOWEST POSITION AND THE CALL LIGHT AT BEDSIDE.
 
FIRE SAFETY MAINTAINED T/O THE NIGHT.

## 2023-09-17 VITALS — DIASTOLIC BLOOD PRESSURE: 65 MMHG | SYSTOLIC BLOOD PRESSURE: 155 MMHG

## 2023-09-17 VITALS — DIASTOLIC BLOOD PRESSURE: 54 MMHG | SYSTOLIC BLOOD PRESSURE: 149 MMHG

## 2023-09-17 VITALS — DIASTOLIC BLOOD PRESSURE: 53 MMHG | SYSTOLIC BLOOD PRESSURE: 125 MMHG

## 2023-09-17 VITALS — SYSTOLIC BLOOD PRESSURE: 129 MMHG | DIASTOLIC BLOOD PRESSURE: 71 MMHG

## 2023-09-17 VITALS — DIASTOLIC BLOOD PRESSURE: 49 MMHG | SYSTOLIC BLOOD PRESSURE: 111 MMHG

## 2023-09-17 VITALS — DIASTOLIC BLOOD PRESSURE: 43 MMHG | SYSTOLIC BLOOD PRESSURE: 119 MMHG

## 2023-09-17 VITALS — DIASTOLIC BLOOD PRESSURE: 47 MMHG | SYSTOLIC BLOOD PRESSURE: 105 MMHG

## 2023-09-17 VITALS — DIASTOLIC BLOOD PRESSURE: 42 MMHG | SYSTOLIC BLOOD PRESSURE: 118 MMHG

## 2023-09-17 VITALS — SYSTOLIC BLOOD PRESSURE: 97 MMHG | DIASTOLIC BLOOD PRESSURE: 77 MMHG

## 2023-09-17 LAB
ANION GAP SERPL CALCULATED.4IONS-SCNC: 4 MMOL/L (ref 6–16)
BASOPHILS # BLD AUTO: 0.02 K/MM3 (ref 0–0.23)
BASOPHILS NFR BLD AUTO: 0 % (ref 0–2)
BUN SERPL-MCNC: 18 MG/DL (ref 8–24)
CALCIUM SERPL-MCNC: 8.2 MG/DL (ref 8.5–10.1)
CHLORIDE SERPL-SCNC: 105 MMOL/L (ref 98–108)
CO2 SERPL-SCNC: 29 MMOL/L (ref 21–32)
CREAT SERPL-MCNC: 0.71 MG/DL (ref 0.4–1)
DEPRECATED RDW RBC AUTO: 43.4 FL (ref 35.1–46.3)
EOSINOPHIL # BLD AUTO: 0.04 K/MM3 (ref 0–0.68)
EOSINOPHIL NFR BLD AUTO: 0 % (ref 0–6)
ERYTHROCYTE [DISTWIDTH] IN BLOOD BY AUTOMATED COUNT: 12.9 % (ref 11.7–14.2)
GLUCOSE SERPL-MCNC: 117 MG/DL (ref 70–99)
HCT VFR BLD AUTO: 20.5 % (ref 33–51)
HCT VFR BLD AUTO: 22.1 % (ref 33–51)
HGB BLD-MCNC: 6.8 G/DL (ref 11.5–16)
HGB BLD-MCNC: 7.3 G/DL (ref 11.5–16)
IMM GRANULOCYTES # BLD AUTO: 0.04 K/MM3 (ref 0–0.1)
IMM GRANULOCYTES NFR BLD AUTO: 0 % (ref 0–1)
LYMPHOCYTES # BLD AUTO: 4.71 K/MM3 (ref 0.84–5.2)
LYMPHOCYTES NFR BLD AUTO: 37 % (ref 21–46)
MCHC RBC AUTO-ENTMCNC: 33.2 G/DL (ref 31.5–36.5)
MCV RBC AUTO: 92 FL (ref 80–100)
MONOCYTES # BLD AUTO: 1.13 K/MM3 (ref 0.16–1.47)
MONOCYTES NFR BLD AUTO: 9 % (ref 4–13)
NEUTROPHILS # BLD AUTO: 6.71 K/MM3 (ref 1.96–9.15)
NEUTROPHILS NFR BLD AUTO: 53 % (ref 41–73)
NRBC # BLD AUTO: 0 K/MM3 (ref 0–0.02)
NRBC BLD AUTO-RTO: 0 /100 WBC (ref 0–0.2)
PLATELET # BLD AUTO: 221 K/MM3 (ref 150–400)
POTASSIUM SERPL-SCNC: 3.7 MMOL/L (ref 3.5–5.5)
SODIUM SERPL-SCNC: 138 MMOL/L (ref 136–145)

## 2023-09-17 NOTE — NUR
PATIENT UPDATE
PATIENT BECOMING INCREASINLY LETHARGIC, VITAL SIGNS STABLE AT THIS TIME. TELE
READS SR 92.  RLE WRAPPED WITH ACE AT SITE OF HEMATOMA FOR PRESSURE. AWAITING
CT SCAN AT THIS TIME. AWAITING BLOOD TO BE READY TO ADMINISTER. PT NOTED TO
SHIVER, OR SLIGHTLY SHAKE. C/O BEING COLD, ORAL TEMP WNL. NEW DX OF SEIZURES
IN MAY, SEIZURE PADS PLACED ON BED FOR SAFETY.

## 2023-09-17 NOTE — NUR
SUMMARY
PT MORE CONFUSED AND ANXIOUS THIS AM, PULLING AT LINES AND ATTEMPTING TO GET
OOB, RECEIVED 1 UNIT PRBC, HAD 1:1 SITTER FOR SAFETY, PT WAS MORE CALM AFTER
BLOOD TRANSFUSION AND TAKING A NAP THIS AFTERNOON, 1:1 SITTER DC'D THIS
AFTERNOON, PT WAS RESTLESS THIS AFTERNOON, ASSISTED TO RECLINER CHAIR, PT IS
DOING BETTER, VISITING WITH DAUGHTER JULIA, MORE PLEASANT AND COOPERATIVE
THIS AFTERNOON, NO SYNCOPAL EPISODES OR SEIZURE ACTIVITY NOTED, NO OTHER
CHANGES THIS SHIFT.

## 2023-09-17 NOTE — NUR
1 UNIT PRBC STARTED THIS AM, PT CURRENTLY IN BED BUT KEEPS ATTEMPTING TO GET
OOB, REFUSED TO USE BED GREER, NOLAN CATH PLACED THIS AM, UNABLE TO GET PT OOB
SAFELY DUE TO SYNCOPAL EPISODE LAST NIGHT AND LOW H&H AND HEMATOMA ON L HIP
AREA, PULLING AT LINES AND TUBES, CNA HAS BEENIN PT'S ROOM FOR CLOSER
MONITORING.

## 2023-09-17 NOTE — NUR
SHIFT SUMMARY
POD3 RIGHT HIP PINNING, SMALL AREA OF EXUDATE NOTED ON AQUACELL. BRUISING
NOTED ON THE PATIENTS THIGH AT THE BEGINNING OF THIS SHIFT, THIS HAS
SIGNIFIGANTLY GROWN T/O THE NIGHT AND NOW WRAPS AROUND THE INSIDE OF THE
PATIENTS THIGH. PT REPORTS THIS IS PAINFUL AND WARM TO THE TOUCH. AM
LABS SHOW LOW H/H, DR BAKER ORDERED CT SCAN AND 1U PRBC THIS AM. PT HAS NOT
BEEN OOB SINCE RAPID RESPONSE, SEE PREVIOUS NOTE FOR DETAILS. INCONTINENT
VOID NOTED. VSS. PT REMAINS ON 2L VIA NC, PT UNABLE TO BE WEANED AT THIS TIME.
PT HAS SLEPT ON AND OFF T/O THE NIGHT, APPEARS LETHARGIC. NO NARCOTICS GIVEN
T/O THE NIGHT.

## 2023-09-18 VITALS — DIASTOLIC BLOOD PRESSURE: 70 MMHG | SYSTOLIC BLOOD PRESSURE: 164 MMHG

## 2023-09-18 VITALS — DIASTOLIC BLOOD PRESSURE: 62 MMHG | SYSTOLIC BLOOD PRESSURE: 155 MMHG

## 2023-09-18 VITALS — DIASTOLIC BLOOD PRESSURE: 59 MMHG | SYSTOLIC BLOOD PRESSURE: 149 MMHG

## 2023-09-18 VITALS — DIASTOLIC BLOOD PRESSURE: 67 MMHG | SYSTOLIC BLOOD PRESSURE: 173 MMHG

## 2023-09-18 LAB
ALBUMIN SERPL BCP-MCNC: 2.1 G/DL (ref 3.4–5)
ALBUMIN/GLOB SERPL: 0.8 {RATIO} (ref 0.8–1.8)
ALT SERPL W P-5'-P-CCNC: 17 U/L (ref 12–78)
ANION GAP SERPL CALCULATED.4IONS-SCNC: 3 MMOL/L (ref 6–16)
AST SERPL W P-5'-P-CCNC: 47 U/L (ref 12–37)
BASOPHILS # BLD: 0.1 K/MM3 (ref 0–0.23)
BASOPHILS NFR BLD: 1 % (ref 0–2)
BILIRUB SERPL-MCNC: 0.5 MG/DL (ref 0.1–1)
BUN SERPL-MCNC: 12 MG/DL (ref 8–24)
CALCIUM SERPL-MCNC: 8.3 MG/DL (ref 8.5–10.1)
CHLORIDE SERPL-SCNC: 108 MMOL/L (ref 98–108)
CO2 SERPL-SCNC: 29 MMOL/L (ref 21–32)
CREAT SERPL-MCNC: 0.64 MG/DL (ref 0.4–1)
DEPRECATED RDW RBC AUTO: 54 FL (ref 35.1–46.3)
EOSINOPHIL # BLD: 0.1 K/MM3 (ref 0–0.68)
EOSINOPHIL NFR BLD: 1 % (ref 0–6)
ERYTHROCYTE [DISTWIDTH] IN BLOOD BY AUTOMATED COUNT: 16.8 % (ref 11.7–14.2)
GLOBULIN SER CALC-MCNC: 2.6 G/DL (ref 2.2–4)
GLUCOSE SERPL-MCNC: 89 MG/DL (ref 70–99)
HCT VFR BLD AUTO: 22.2 % (ref 33–51)
HGB BLD-MCNC: 7.3 G/DL (ref 11.5–16)
LYMPHOCYTES # BLD: 4.38 K/MM3 (ref 0.84–5.2)
LYMPHOCYTES NFR BLD: 40 % (ref 21–46)
MCHC RBC AUTO-ENTMCNC: 32.9 G/DL (ref 31.5–36.5)
MCV RBC AUTO: 89 FL (ref 80–100)
MONOCYTES # BLD: 0.32 K/MM3 (ref 0.16–1.47)
MONOCYTES NFR BLD: 3 % (ref 4–13)
NEUTS SEG # BLD MANUAL: 6.03 K/MM3 (ref 1.96–9.15)
NEUTS SEG NFR BLD MANUAL: 55 % (ref 41–73)
NRBC # BLD AUTO: 0 K/MM3 (ref 0–0.02)
NRBC BLD AUTO-RTO: 0 /100 WBC (ref 0–0.2)
PLATELET # BLD AUTO: 198 K/MM3 (ref 150–400)
POTASSIUM SERPL-SCNC: 4.1 MMOL/L (ref 3.5–5.5)
PROT SERPL-MCNC: 4.7 G/DL (ref 6.4–8.2)
SODIUM SERPL-SCNC: 140 MMOL/L (ref 136–145)
TOTAL CELLS COUNTED BLD: 100

## 2023-09-18 NOTE — NUR
SHIFT SUMMARY
POD4 RIGHT HIP NAILING. TOP AQUACEL CHANGED D/T SS SATURATION. PT IS MOVING
LEG INDEPENDENTLY, BUT C/O PAIN. MEDICATED WITH TORADOL, TYLENOL, AND ULTRAM.
PT SLEPT ON AND OFF T/O THE NIGHT. CONFUSION INCREASED T/O THE NIGHT, PT
YELLING OUT. PT REMAINED PLEASENT THOUGH. NOLAN REMAINS IN PLACE. DRAINING
YELLOW URINE. NO STOOL NOTED. PT TOLLERATED MINIMAL PO INTAKE. NO ACUTE
EVENTS. TELE READS SINUS T/O THE NIGHT.

## 2023-09-18 NOTE — NUR
DISCHARGE SUMMARY
S/P R HIP FX REPAIR, PT CONFUSED AND UNABLE TO FOLLOW DIRECTIONS MOST OF THE
TIME, DISCHARGE PACKET PUT TOGETHER AND GIVEN TO HER SON WHO IS ONE OF HER
CARETAKERS WHICH CONTAINED ALL OF HER DISCHARGE INSTRUCTIONS AND
PRESCRIPTION/PRESCRIPTION INFORMATION. IV REMOVED PRIOR TO DC, NOLAN OUT PRIOR
TO DC. PT PICKED UP BY EMS TO BE TRANSPORTED HOME.

## 2023-09-19 ENCOUNTER — HOSPITAL ENCOUNTER (INPATIENT)
Dept: HOSPITAL 95 - ER | Age: 88
LOS: 15 days | Discharge: HOME | DRG: 193 | End: 2023-10-04
Attending: HOSPITALIST | Admitting: HOSPITALIST
Payer: COMMERCIAL

## 2023-09-19 ENCOUNTER — HOSPITAL ENCOUNTER (EMERGENCY)
Dept: HOSPITAL 95 - ER | Age: 88
Discharge: HOME | End: 2023-09-19
Payer: COMMERCIAL

## 2023-09-19 VITALS — BODY MASS INDEX: 18.17 KG/M2 | HEIGHT: 67 IN | WEIGHT: 115.74 LBS

## 2023-09-19 VITALS — BODY MASS INDEX: 21.35 KG/M2 | WEIGHT: 136 LBS | HEIGHT: 67 IN

## 2023-09-19 VITALS — SYSTOLIC BLOOD PRESSURE: 144 MMHG | DIASTOLIC BLOOD PRESSURE: 54 MMHG

## 2023-09-19 DIAGNOSIS — F32.A: ICD-10-CM

## 2023-09-19 DIAGNOSIS — Z66: ICD-10-CM
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
## 2023-09-19 DIAGNOSIS — Z79.899: ICD-10-CM

## 2023-09-19 DIAGNOSIS — Z20.822: ICD-10-CM

## 2023-09-19 DIAGNOSIS — D72.829: ICD-10-CM

## 2023-09-19 DIAGNOSIS — G93.40: ICD-10-CM

## 2023-09-19 DIAGNOSIS — F03.90: ICD-10-CM

## 2023-09-19 DIAGNOSIS — J96.01: ICD-10-CM

## 2023-09-19 DIAGNOSIS — I10: ICD-10-CM

## 2023-09-19 DIAGNOSIS — S72.91XA: ICD-10-CM

## 2023-09-19 DIAGNOSIS — Z23: ICD-10-CM

## 2023-09-19 DIAGNOSIS — R56.9: ICD-10-CM

## 2023-09-19 DIAGNOSIS — Z87.81: ICD-10-CM

## 2023-09-19 DIAGNOSIS — Z86.73: ICD-10-CM

## 2023-09-19 DIAGNOSIS — J98.11: ICD-10-CM

## 2023-09-19 DIAGNOSIS — M25.551: Primary | ICD-10-CM

## 2023-09-19 DIAGNOSIS — Z79.01: ICD-10-CM

## 2023-09-19 DIAGNOSIS — Z98.890: ICD-10-CM

## 2023-09-19 DIAGNOSIS — J18.9: Primary | ICD-10-CM

## 2023-09-19 DIAGNOSIS — D62: ICD-10-CM

## 2023-09-19 LAB
ALBUMIN SERPL BCP-MCNC: 2.4 G/DL (ref 3.4–5)
ALBUMIN/GLOB SERPL: 0.8 {RATIO} (ref 0.8–1.8)
ALT SERPL W P-5'-P-CCNC: 19 U/L (ref 12–78)
AST SERPL W P-5'-P-CCNC: 37 U/L (ref 12–37)
BASOPHILS # BLD: 0.14 K/MM3 (ref 0–0.23)
BASOPHILS NFR BLD: 1 % (ref 0–2)
BILIRUB SERPL-MCNC: 0.7 MG/DL (ref 0.1–1)
BUN SERPL-MCNC: 14 MG/DL (ref 8–24)
CALCIUM SERPL-MCNC: 8.8 MG/DL (ref 8.5–10.1)
CO2 SERPL-SCNC: 30 MMOL/L (ref 21–32)
CREAT SERPL-MCNC: 0.63 MG/DL (ref 0.4–1)
DEPRECATED RDW RBC AUTO: 49.1 FL (ref 35.1–46.3)
EOSINOPHIL # BLD: 0.14 K/MM3 (ref 0–0.68)
EOSINOPHIL NFR BLD: 1 % (ref 0–6)
ERYTHROCYTE [DISTWIDTH] IN BLOOD BY AUTOMATED COUNT: 15.4 % (ref 11.7–14.2)
FLUAV RNA SPEC QL NAA+PROBE: NEGATIVE
FLUBV RNA SPEC QL NAA+PROBE: NEGATIVE
GLOBULIN SER CALC-MCNC: 3 G/DL (ref 2.2–4)
GLUCOSE SERPL-MCNC: 169 MG/DL (ref 70–99)
HCT VFR BLD AUTO: 24.9 % (ref 33–51)
HGB BLD-MCNC: 8.3 G/DL (ref 11.5–16)
LYMPHOCYTES # BLD: 6.02 K/MM3 (ref 0.84–5.2)
LYMPHOCYTES NFR BLD: 41 % (ref 21–46)
MCHC RBC AUTO-ENTMCNC: 33.3 G/DL (ref 31.5–36.5)
MCV RBC AUTO: 88 FL (ref 80–100)
MONOCYTES # BLD: 0.73 K/MM3 (ref 0.16–1.47)
MONOCYTES NFR BLD: 5 % (ref 4–13)
NEUTS SEG # BLD MANUAL: 7.63 K/MM3 (ref 1.96–9.15)
NEUTS SEG NFR BLD MANUAL: 52 % (ref 41–73)
NRBC # BLD AUTO: 0 K/MM3 (ref 0–0.02)
NRBC BLD AUTO-RTO: 0 /100 WBC (ref 0–0.2)
PLATELET # BLD AUTO: 320 K/MM3 (ref 150–400)
PROT SERPL-MCNC: 5.4 G/DL (ref 6.4–8.2)
RSV RNA SPEC QL NAA+PROBE: NEGATIVE
SARS-COV-2 RNA RESP QL NAA+PROBE: NEGATIVE
TOTAL CELLS COUNTED BLD: 100

## 2023-09-19 PROCEDURE — A9270 NON-COVERED ITEM OR SERVICE: HCPCS

## 2023-09-20 VITALS — SYSTOLIC BLOOD PRESSURE: 131 MMHG | DIASTOLIC BLOOD PRESSURE: 79 MMHG

## 2023-09-20 VITALS — SYSTOLIC BLOOD PRESSURE: 135 MMHG | DIASTOLIC BLOOD PRESSURE: 52 MMHG

## 2023-09-20 VITALS — SYSTOLIC BLOOD PRESSURE: 157 MMHG | DIASTOLIC BLOOD PRESSURE: 67 MMHG

## 2023-09-20 LAB
ALBUMIN SERPL BCP-MCNC: 2.5 G/DL (ref 3.4–5)
ALBUMIN/GLOB SERPL: 0.9 {RATIO} (ref 0.8–1.8)
ALT SERPL W P-5'-P-CCNC: 18 U/L (ref 12–78)
ANION GAP SERPL CALCULATED.4IONS-SCNC: 4 MMOL/L (ref 6–16)
ANION GAP SERPL CALCULATED.4IONS-SCNC: 5 MMOL/L (ref 6–16)
AST SERPL W P-5'-P-CCNC: 33 U/L (ref 12–37)
BASOPHILS # BLD: 0 K/MM3 (ref 0–0.23)
BASOPHILS NFR BLD: 0 % (ref 0–2)
BILIRUB SERPL-MCNC: 0.7 MG/DL (ref 0.1–1)
BUN SERPL-MCNC: 11 MG/DL (ref 8–24)
CALCIUM SERPL-MCNC: 8.7 MG/DL (ref 8.5–10.1)
CHLORIDE SERPL-SCNC: 102 MMOL/L (ref 98–108)
CHLORIDE SERPL-SCNC: 104 MMOL/L (ref 98–108)
CO2 SERPL-SCNC: 29 MMOL/L (ref 21–32)
CREAT SERPL-MCNC: 0.66 MG/DL (ref 0.4–1)
DEPRECATED RDW RBC AUTO: 48.8 FL (ref 35.1–46.3)
EOSINOPHIL # BLD: 0.12 K/MM3 (ref 0–0.68)
EOSINOPHIL NFR BLD: 1 % (ref 0–6)
ERYTHROCYTE [DISTWIDTH] IN BLOOD BY AUTOMATED COUNT: 15.2 % (ref 11.7–14.2)
GLOBULIN SER CALC-MCNC: 2.9 G/DL (ref 2.2–4)
GLUCOSE SERPL-MCNC: 103 MG/DL (ref 70–99)
HCT VFR BLD AUTO: 25.3 % (ref 33–51)
HGB BLD-MCNC: 8.2 G/DL (ref 11.5–16)
LYMPHOCYTES # BLD: 2.46 K/MM3 (ref 0.84–5.2)
LYMPHOCYTES NFR BLD: 16 % (ref 21–46)
MCHC RBC AUTO-ENTMCNC: 32.4 G/DL (ref 31.5–36.5)
MCV RBC AUTO: 89 FL (ref 80–100)
MONOCYTES # BLD: 0.51 K/MM3 (ref 0.16–1.47)
MONOCYTES NFR BLD: 4 % (ref 4–13)
NEUTS SEG # BLD MANUAL: 9.87 K/MM3 (ref 1.96–9.15)
NEUTS SEG NFR BLD MANUAL: 76 % (ref 41–73)
NRBC # BLD AUTO: 0 K/MM3 (ref 0–0.02)
NRBC BLD AUTO-RTO: 0 /100 WBC (ref 0–0.2)
PLATELET # BLD AUTO: 318 K/MM3 (ref 150–400)
POTASSIUM SERPL-SCNC: 3.9 MMOL/L (ref 3.5–5.5)
POTASSIUM SERPL-SCNC: 4 MMOL/L (ref 3.5–5.5)
PROT SERPL-MCNC: 5.4 G/DL (ref 6.4–8.2)
SODIUM SERPL-SCNC: 136 MMOL/L (ref 136–145)
SODIUM SERPL-SCNC: 138 MMOL/L (ref 136–145)
TOTAL CELLS COUNTED BLD: 100
VARIANT LYMPHS NFR BLD MANUAL: 3 % (ref 0–0)

## 2023-09-20 NOTE — NUR
SHIFT SUMMARY NOC
 
ADMIT FROM ED WITH ARF/HYPOXIA. PT HAS HX OF DEMENTIA AND IS CONFUSED
BASELINE. PT ON 2L/NC O2 BUT BASELINE IS RA AT HOME. PT HAS EXTENSIVE BRUISING
ON ENTURE R BLANCHE AREA FROM PREVIOUS FALL AT HOME RECENTLY. PT IS A/O TO SELF
AND HIGHLY CONFUSED. PT HAS NS INFUSING @ 75 ML/HR. PT IV IS HIGHLY PROTECTED
DUE TO PT PULLING PREVIOUS IV IN ED.  PT IS CURRENTLY RESTING WITH TAB ALARM
ON, BED IN LOWEST POSITION, AND CALL LIGHT WITHIN REACH.

## 2023-09-20 NOTE — NUR
SHIFT SUMMARY
PT AWAKE DURING SHIFT REPORT, CONFUSED AND DISORIENTED. PT CALLING OUT MOST OF
THE DAY, WANTING TO KNOW WHAT TO DO. SPEECH TX IN EARLY FOR SWALLOW EVAL. NO
DIFFICULTY IN EATING OR DRINKING. LUNGS CTA. NO COUGHING NOTED TO PRESENT THIS
SHIFT. PT PULLED OUT IV SITE RIGHT AFTER START OF SHIFT. NEW IV PLACED BY CHRG
RN. PT NOW JUST PULLED THAT IV OUT TONIGHT. PT REMAINS VERY CONFUSED AND
UNABLE TO ORIENT AT ALL. THERAPY ATTEMPTED TO WORK WITH PT TODAY. PT IS WEAK
AND DIFFICULT TO GET MOBILE D/T RECENT R HIP SX. PT ALSO HAS DIFFICULTY
FOLLOWING DIRECTIONS MOST OF THE TIME D/T DEMENTIA. MEDICATED FOR POSSIBLE
PAIN, BY COVERING RN; PT DOES NOT ANS QUESTIONS APPROPRIATELY. PT DID SEEM
TO REST QUIETLY FOR A SHORT WHILE AFTERWARD. BED BATH AND LINEN CHANGE GIVEN
THIS AFTERNOON. BED ALARM ON FOR SAFETY. CALL LT IN REACH.

## 2023-09-21 VITALS — DIASTOLIC BLOOD PRESSURE: 59 MMHG | SYSTOLIC BLOOD PRESSURE: 146 MMHG

## 2023-09-21 VITALS — DIASTOLIC BLOOD PRESSURE: 66 MMHG | SYSTOLIC BLOOD PRESSURE: 166 MMHG

## 2023-09-21 VITALS — SYSTOLIC BLOOD PRESSURE: 150 MMHG | DIASTOLIC BLOOD PRESSURE: 60 MMHG

## 2023-09-21 LAB
ANION GAP SERPL CALCULATED.4IONS-SCNC: 4 MMOL/L (ref 6–16)
BUN SERPL-MCNC: 9 MG/DL (ref 8–24)
CALCIUM SERPL-MCNC: 8.5 MG/DL (ref 8.5–10.1)
CHLORIDE SERPL-SCNC: 108 MMOL/L (ref 98–108)
CO2 SERPL-SCNC: 29 MMOL/L (ref 21–32)
CREAT SERPL-MCNC: 0.69 MG/DL (ref 0.4–1)
DEPRECATED RDW RBC AUTO: 48.1 FL (ref 35.1–46.3)
ERYTHROCYTE [DISTWIDTH] IN BLOOD BY AUTOMATED COUNT: 15.2 % (ref 11.7–14.2)
GLUCOSE SERPL-MCNC: 87 MG/DL (ref 70–99)
HCT VFR BLD AUTO: 25.9 % (ref 33–51)
HGB BLD-MCNC: 8.4 G/DL (ref 11.5–16)
MCHC RBC AUTO-ENTMCNC: 32.4 G/DL (ref 31.5–36.5)
MCV RBC AUTO: 89 FL (ref 80–100)
NRBC # BLD AUTO: 0 K/MM3 (ref 0–0.02)
NRBC BLD AUTO-RTO: 0 /100 WBC (ref 0–0.2)
PLATELET # BLD AUTO: 379 K/MM3 (ref 150–400)
POTASSIUM SERPL-SCNC: 4.3 MMOL/L (ref 3.5–5.5)
SODIUM SERPL-SCNC: 141 MMOL/L (ref 136–145)

## 2023-09-21 NOTE — NUR
SHIFT SUMMARY
PT IS A&O TO SELF ONLY, BEDREST, PT PULLED IV OUT 2X OVERNIGHT CONTINUES TO
YELL OUT "HELP", AND "SAVE ME' PROVIDER NOTIFIED ONE TIME DOSE SEROQUEL AND
HALDOL PRN ORDERED, SEROQUEL AND PRN PAIN MEDICATION GIVEN X1 PER EMAR, WILL
REPORT OFF TO DAY RN

## 2023-09-21 NOTE — NUR
PATIENT ORINETED TO SELF ONLY, UNABLE TO CONSOLE AT TIMES, UNABLE TO REDIRECT
OR EDUCATE, MEDICATED FOR PAIN, CONFUSION AND AGGITATION INCREASED AT 5 PM
TODAY, PATIENT SCRAPES AT STAFF ARMS WHEN TRYING TO CHANGE ATTENDS, PATIENT
PULLING AT LINES AND SURGICAL DRESSING, WILL RELAY TO PM RN

## 2023-09-21 NOTE — NUR
Case Conference: Spoke to Nurse Manager Aidan at [facility] yesterday
regarding this patient. She relayed that the patient was only recently
admitted to Home Health, and when the RN arrived to admit her, she instead
sent the patient to ED for hypoxia. She was recently hospitalized for a femur
fracture. She also has late stage dementia.
 
I spoke to the pt's daughter Imelda yesterday and again this morning. She
tells me she is in the process of applying for Medicaid for her mom, but
according to Care Management, she has not been successful in completing the
paperwork thus far. I asked Imelda the reason for the hold up, and she stated
she has had a very hard time gathering the needed documentation, in part due
to being the pt's sole caregiver, overwhelming anxiety, and not understanding
initially about the "spend down", and other pieces of needed documentation.
She is perseverating on the burial plot today, stating she has no idea how to
get ahold of the paperwork for it, as it was done years ago in [facility], and she isn't able to ask her mom about it now due to pt's later
stage dementia.
 
Imelda states she has become unable to provide proper care for her mom due to
the set up of the home, and pt's dementia. Imelda seems to struggle to remain
on topic during our lengthy conversations, and while she states she now only
needs bank statements from her mom's local bank and one in [place], she
also became confused, wondering if she needed to fax the statements to me. I
reminded her I'm a nurse at [facility], and she needs to fax or give the requested
documents to the [employer] worker Betsy. During our conversations, I did have to
gently redirect Imelda to keep her on topic. She is very friendly over the
phone, but comes across very scattered.
 
I have voiced my concerns to Care Management and RAJANI Card. I also encouraged
the nurse manager at [facility] to follow up with Adult Protective Services if
she feels the patient is unsafe in the home due to lack of training, house set
up, etc.
 
I encouraged Imelda to focus on getting the needed bank statements today, and
will continue to attempt to redirect and encourage her,
as the patient is in dire need of
placement. Otherwise, the pt will continue the cycle of discharge and
readmission to the hospital with increasing frequency.

## 2023-09-22 VITALS — DIASTOLIC BLOOD PRESSURE: 54 MMHG | SYSTOLIC BLOOD PRESSURE: 131 MMHG

## 2023-09-22 VITALS — DIASTOLIC BLOOD PRESSURE: 90 MMHG | SYSTOLIC BLOOD PRESSURE: 162 MMHG

## 2023-09-22 VITALS — DIASTOLIC BLOOD PRESSURE: 55 MMHG | SYSTOLIC BLOOD PRESSURE: 134 MMHG

## 2023-09-22 VITALS — SYSTOLIC BLOOD PRESSURE: 175 MMHG | DIASTOLIC BLOOD PRESSURE: 73 MMHG

## 2023-09-22 NOTE — NUR
SHIFT SUMMARY
PT IS A&O TO SELF ONLY, BEDREST DUE TO WEAKNESS, 2L NC SATS LOW TO MID 90'S,
VSS, PAIN MEDICATION GIVEN PER MAR X1 FOR RIGHT HIP PAIN, PT WAS
NONREDIRECTABLE THIS SHIFT WHILE ATTEMPTING TO REMOVE DRESSING, GOWN, AND
NASAL CANNULA, HALDOL GIVEN PER MAR X1, AQUACELL ON RIGHT HIP REPLACED CDI,
BED ALARM ON, CONTINUE POC

## 2023-09-22 NOTE — NUR
NO ACUTE CHANGES, NO IMPROVEMENT OR WORSENING OF CONFUSION, PLEASANTLY
CONFUSED TO DAY, EASILY REDIRECTED TODAY, FAMILY VISITING, WAIITNG FOR
PLACEMENT, IVF, -170 SBP, ASYMPTOMATIC. BED BOUND, ATTENDS CANGED
FREQUENTLY, PATIENT UNSURE OF WHEN SHE NEEDS TO VOID, INCONTIENET OF BOWEL AND
BLADDER, FAMILY IN VISITNG, WILL RELAY TO PM

## 2023-09-23 VITALS — DIASTOLIC BLOOD PRESSURE: 56 MMHG | SYSTOLIC BLOOD PRESSURE: 139 MMHG

## 2023-09-23 VITALS — DIASTOLIC BLOOD PRESSURE: 53 MMHG | SYSTOLIC BLOOD PRESSURE: 129 MMHG

## 2023-09-23 VITALS — DIASTOLIC BLOOD PRESSURE: 73 MMHG | SYSTOLIC BLOOD PRESSURE: 161 MMHG

## 2023-09-23 VITALS — DIASTOLIC BLOOD PRESSURE: 64 MMHG | SYSTOLIC BLOOD PRESSURE: 143 MMHG

## 2023-09-23 LAB
ANION GAP SERPL CALCULATED.4IONS-SCNC: 3 MMOL/L (ref 6–16)
BUN SERPL-MCNC: 11 MG/DL (ref 8–24)
CALCIUM SERPL-MCNC: 8.8 MG/DL (ref 8.5–10.1)
CHLORIDE SERPL-SCNC: 104 MMOL/L (ref 98–108)
CO2 SERPL-SCNC: 31 MMOL/L (ref 21–32)
CREAT SERPL-MCNC: 0.61 MG/DL (ref 0.4–1)
GLUCOSE SERPL-MCNC: 122 MG/DL (ref 70–99)
POTASSIUM SERPL-SCNC: 4 MMOL/L (ref 3.5–5.5)
SODIUM SERPL-SCNC: 138 MMOL/L (ref 136–145)

## 2023-09-23 NOTE — NUR
PATIENT SLEPT THROUGH THE MORNING, AM MEDICATIONS GIVEN LATE, PATIENT PLEASANT
AND CONFUSED, YELLING HELP INTERMITTENTLY, PULLED ONE OF THE RIGHT HIP
DRESSINGS OFF, REPLACED WITH NEW AQUA DEV. MEDICATED FOR PAIN WITH OXY AND
TYLENOL, NEW IV PLACED LEFT FOREARM, NS INFUSING AT 75 ML/HR. NO ACUTE
CHANGES, RELAYED TO PM RN, CALL LIGHT WITH IN REACH, BED ALARM ON

## 2023-09-23 NOTE — NUR
END OF SHIFT SUMMARY
PT VERY RESTLESS OVERNIGHT. PRN MELATONIN GIVEN AND EFFECTIVE. IN ADDITION,
2.5MG PRN PO HALDOL GIVEN, AS PT WAS EXPERIENCING VISUAL HALLUCINATIONS.
0545: PT RECEIVED PRN OXYCODONE AND APAP FOR PAIN BEFORE TURNING PT IN BED. PT
CRIED OUT FOR HELP MULTIPLE TIMES THROUGHOUT THE NIGHT. PT PROBABLY ONLY SLEPT
4-5 HOURS ON NIGHT SHIFT. PT INCONTINENT OF BOWEL AND BLADDER, 1 EPISODE OF
INCONTINENCE OCCURRED, BED CHANGE x1. PT WAS CONFUSED AND ANXIOUS, WANTING TO
GET UP OOB TO USE THE TOILET. PT A&O x1, TO SELF. LOTION WAS APPLIED TO LEGS
AND FEET, SOOTHING MUSIC FROM THE CARE TV CHANNEL WAS ON. THIS RN VISITED WITH
PT TO TRY TO CALM PT DOWN. PT ON 2L VIA NC, NO REP DISTRESS NOTED. CALL LIGHT
WITHIN REACH, WCTM.

## 2023-09-24 VITALS — DIASTOLIC BLOOD PRESSURE: 55 MMHG | SYSTOLIC BLOOD PRESSURE: 150 MMHG

## 2023-09-24 VITALS — SYSTOLIC BLOOD PRESSURE: 124 MMHG | DIASTOLIC BLOOD PRESSURE: 52 MMHG

## 2023-09-24 VITALS — DIASTOLIC BLOOD PRESSURE: 67 MMHG | SYSTOLIC BLOOD PRESSURE: 172 MMHG

## 2023-09-24 VITALS — SYSTOLIC BLOOD PRESSURE: 123 MMHG | DIASTOLIC BLOOD PRESSURE: 47 MMHG

## 2023-09-24 LAB
DEPRECATED RDW RBC AUTO: 50.9 FL (ref 35.1–46.3)
ERYTHROCYTE [DISTWIDTH] IN BLOOD BY AUTOMATED COUNT: 15.7 % (ref 11.7–14.2)
FERRITIN SERPL-MCNC: 90 NG/ML (ref 8–252)
HCT VFR BLD AUTO: 27 % (ref 33–51)
HGB BLD-MCNC: 8.5 G/DL (ref 11.5–16)
MCHC RBC AUTO-ENTMCNC: 31.5 G/DL (ref 31.5–36.5)
MCV RBC AUTO: 91 FL (ref 80–100)
NRBC # BLD AUTO: 0 K/MM3 (ref 0–0.02)
NRBC BLD AUTO-RTO: 0 /100 WBC (ref 0–0.2)
PLATELET # BLD AUTO: 483 K/MM3 (ref 150–400)
TIBC SERPL-MCNC: 176 UG/DL (ref 250–450)

## 2023-09-24 NOTE — NUR
PT IS ALERT TO SELF ONLY. SHE IS PLEASANT, ALTHOUGH CONFUSED AND WILL CALL OUT
INSTEAD OF USE THE CALL LIGHT. SHE LIKES TO SIT AT THE EDGE OF THE BED, AND IS
EAGER TO TRANSFER WITH 2 PERSON ASSIST. SHE WILL EAT, BUT REQUIRES
ENCOURAGEMENT FROM STAFF AND SMALL FREQUENT SNACKS (EXAMPLE HALF AN ENSURE, A
PUDDING, APPLESAUCE, ETC). NO ACUTE CHANGES. NASAL CANULA 2LPM. PUREWICK IN
PLACE TO LOW CONTIUNOUS SUCTION, DRAINING NOEMÍ URINE. COOPERATIVE WITH CARE.
DENIES PAIN.

## 2023-09-24 NOTE — NUR
END OF SHIFT SUMMARY
NO EVENTS OCCURRED, PT SLEPT WELL OVER NIGHT SHIFT. PT AMBULATED SAFELY TO
[facility], WITH 2P ASSIST STAND PIV. PT HAD STEADY GAIT WITH TRANSFER. A PUREWICK
WAS PLACED TO PREVENT EPISODES OF INCONTINENCE/TOTAL BED CHANGES. VSS, PT
AFEBRILE. PT CALM AND COOPERATIVE WITH CARE PROVIDED. PT VERY APPRECIATIVE OF
CARE.
 
NO SIGNS OF ANXIETY, PT DID NOT CALL OUT FOR
HELP. PRN MEDICATIONS OXYCODONE, AND SCHEDULED ZYPREXA EFFECTIVE IN HELPING PT
GET COMFORTABLE TO GET SOME SLEEP. PT ABLE TO MAKE NEEDS KNOWN, CALL LIGHT
WITHIN REACH, WCTM.

## 2023-09-25 VITALS — DIASTOLIC BLOOD PRESSURE: 63 MMHG | SYSTOLIC BLOOD PRESSURE: 155 MMHG

## 2023-09-25 VITALS — SYSTOLIC BLOOD PRESSURE: 148 MMHG | DIASTOLIC BLOOD PRESSURE: 56 MMHG

## 2023-09-25 VITALS — DIASTOLIC BLOOD PRESSURE: 62 MMHG | SYSTOLIC BLOOD PRESSURE: 156 MMHG

## 2023-09-25 VITALS — SYSTOLIC BLOOD PRESSURE: 151 MMHG | DIASTOLIC BLOOD PRESSURE: 55 MMHG

## 2023-09-25 NOTE — NUR
PT IS PLEASANT AND REDIRECTABLE BUT YELLS FOR ASSIST INSTEAD OF USING CALL
LIGHT. FREQUENT CALLS FOR ASSISTANCE THROUGH MOST OF THE NIGHT. X 1-2 ASSIST
TO BSC WITH MULTIPLE BM'S. PUREWICK IN PLACE AND EFFECTIVE. TOLERATES PILLS
CRUSHED IN APPLESAUCE AND THIN LIQUIDS. AOX1, RECENT HALLUCINATIONS, R HIP
PAIN WITH ACTIVITY, DRESSINGS C/D/I. O2 DEPENDENCE IS IMPROVING.

## 2023-09-25 NOTE — NUR
PT AOX3 WITH SOME CONFUSION. PT HAS BEEN COOPERATIVE OF CARE. PT WAS ABLE TO
GET UP TO CHAIR AND BACK TODAY. TREATED FOR R HIP PAIN PER EMAR. PT HAS
PERWICK IN PLACE WHILE IN BED. WILL CONTINUE TO MONITOR.

## 2023-09-26 VITALS — DIASTOLIC BLOOD PRESSURE: 54 MMHG | SYSTOLIC BLOOD PRESSURE: 140 MMHG

## 2023-09-26 VITALS — DIASTOLIC BLOOD PRESSURE: 56 MMHG | SYSTOLIC BLOOD PRESSURE: 106 MMHG

## 2023-09-26 VITALS — DIASTOLIC BLOOD PRESSURE: 61 MMHG | SYSTOLIC BLOOD PRESSURE: 163 MMHG

## 2023-09-26 NOTE — NUR
NO ACUTE CHANGES. PT ALERT AND COOPERATIVE OF CARE. PT IS INCONTENT AND HAS
PERIWICK IN PLACE. PT WORKED WITH OT AND PHYSICAL THERAPY TODAY. NO DISTRESS
NOTED. WILL CONTINUE TO MONITOR.

## 2023-09-26 NOTE — NUR
UNEVENTFUL NIGHT. PT AOX1, PLEASANT, REQUIRES FREQUENT REPEAT REMINDERS ABOUT
PURE WICK, CARE PLAN, ETC. NO BM THIS SHIFT, 800 MLS URINARY OUTPUT. OXYCODONE
5 MG GIVEN X2. NO ACUTE CHANGES NOTED.

## 2023-09-27 VITALS — SYSTOLIC BLOOD PRESSURE: 100 MMHG | DIASTOLIC BLOOD PRESSURE: 90 MMHG

## 2023-09-27 VITALS — SYSTOLIC BLOOD PRESSURE: 107 MMHG | DIASTOLIC BLOOD PRESSURE: 46 MMHG

## 2023-09-27 VITALS — SYSTOLIC BLOOD PRESSURE: 161 MMHG | DIASTOLIC BLOOD PRESSURE: 57 MMHG

## 2023-09-27 VITALS — SYSTOLIC BLOOD PRESSURE: 139 MMHG | DIASTOLIC BLOOD PRESSURE: 78 MMHG

## 2023-09-27 NOTE — NUR
FREQUENT REMINDERS TO STAY IN BED AND USE PUREWICK. PT FREQUENTLY REMOVES
NASAL CANNULA AND SATS IN UPPER 80'S AT THAT TIME. I SAT WITH PATIENT UNTIL
SHE FELL ASLEEP, SHE WAS MORE CALM WHEN STAFF IS PRESENT. NO ACUTE CHANGES
NOTED. COMPLAINS OF R HIP PAIN. MEDICATED WITH OXYCODONE AND TYLENOL FOR PAIN.

## 2023-09-27 NOTE — NUR
SHIFT SUMMARY- PT IS CONFUSED. SHE WENT FOR CHEST XRAYS THIS SHIFT. SHE SLEPT
INTERMITENTLY THROUGHOUT THIS SHIFT. HER BED IS IN THE LOW POSITON AND CALL
LIGHT IS WITHIN REACH.

## 2023-09-28 VITALS — DIASTOLIC BLOOD PRESSURE: 54 MMHG | SYSTOLIC BLOOD PRESSURE: 124 MMHG

## 2023-09-28 VITALS — SYSTOLIC BLOOD PRESSURE: 109 MMHG | DIASTOLIC BLOOD PRESSURE: 63 MMHG

## 2023-09-28 VITALS — SYSTOLIC BLOOD PRESSURE: 125 MMHG | DIASTOLIC BLOOD PRESSURE: 60 MMHG

## 2023-09-28 VITALS — DIASTOLIC BLOOD PRESSURE: 58 MMHG | SYSTOLIC BLOOD PRESSURE: 126 MMHG

## 2023-09-28 NOTE — NUR
SHIFT SUMMARY.
A/O X 1-2 VERY PLEASENT AND COOPERATIVE CONFUSED PT WITH R HIP FX, C/O MINIMAL
PAIN TO HIP. PT WAS MEDICATED BUT SPIT OUT HALF OF CRUSHED MEDS SAYING THEY
WERE TOO BITTER. PT NEEDING A LOT OF REASSURENCE THAT SHE WOULD BE OK IF I
LEFT HER BEDSIDE, PT CONSTANTLY CRYING OUT FOR HELP WITH NO OBVIOUS DISTRESS.
FINALLY PT FELL ASLEEP AND LOOKS COMFORTABLE.

## 2023-09-28 NOTE — NUR
SHIFT SUMMARY;  PATIENT VERY BUSY TODAY. TRYING TO GET UP OUT OF BED.  SHE IS
UP AND DOWN TO BEDSIDE COMMODE.  PATIENT COMPLAINS OF PAIN AND IS MEDICATED
WITH ROXYCODONE 5MG WITH MINIMAL RESULTS.  SHE COMPLAINS OF RIGHT HIP PAIN AND
ASKS THIS RN "TO REMOVE MY LEG IT HURTS TO BAD"
 
PATIENT IS CONFUSED AND GOES BETWEEN BE ANGRY WITH STAFF TO TELLING THEM SHE
LOVES THEM.  PATIENT FEEDS HERSELF AND IS ENCOURAGED TO DRINK FLUIDS TODAY.
 
WILL REMAIN AVAILABLE FOR THIS PATIENT FOR ANY WANTS OR NEEDS THAT MAY COME UP
PRIOR TO REPORT TO ONCOMING NOC SHIFT.

## 2023-09-29 VITALS — SYSTOLIC BLOOD PRESSURE: 98 MMHG | DIASTOLIC BLOOD PRESSURE: 75 MMHG

## 2023-09-29 VITALS — DIASTOLIC BLOOD PRESSURE: 53 MMHG | SYSTOLIC BLOOD PRESSURE: 121 MMHG

## 2023-09-29 VITALS — DIASTOLIC BLOOD PRESSURE: 52 MMHG | SYSTOLIC BLOOD PRESSURE: 124 MMHG

## 2023-09-29 VITALS — SYSTOLIC BLOOD PRESSURE: 147 MMHG | DIASTOLIC BLOOD PRESSURE: 70 MMHG

## 2023-09-29 NOTE — NUR
SHIFT SUMMARY;  PATIENT HAD TO BE CUED DURING DAY ON SWALLOWING HER PO
MEDICATIONS.  SHE IS NOTED TO HOLD THEM IN HER MOUTH AND WAS ENCOURAGED TO
TAKE DRINKS THEN SWALLOW THEM.  ONE AT A TIME PILLS ARE NOW SUGGESTED FOR THIS
PATIENT.

## 2023-09-30 VITALS — SYSTOLIC BLOOD PRESSURE: 102 MMHG | DIASTOLIC BLOOD PRESSURE: 56 MMHG

## 2023-09-30 VITALS — SYSTOLIC BLOOD PRESSURE: 126 MMHG | DIASTOLIC BLOOD PRESSURE: 47 MMHG

## 2023-09-30 VITALS — SYSTOLIC BLOOD PRESSURE: 114 MMHG | DIASTOLIC BLOOD PRESSURE: 69 MMHG

## 2023-09-30 VITALS — SYSTOLIC BLOOD PRESSURE: 123 MMHG | DIASTOLIC BLOOD PRESSURE: 50 MMHG

## 2023-09-30 NOTE — NUR
SHIFT SUMMARY
MS SOFIA WAS VERY SLEEPY AND QUIET THIS MORNING BUT DID WAKE UP, HAVE A
BED BATH AND SIT UP IN THE CHAIR FOR A WHILE THIS AFTERNOON. SHE WAS MORE
INTERACTIVE AND TALKATIVE THIS AFTERNOON. SHE HAS HAD A VERY POOR APPETITE FOR
FOOD OR FLUIDS ALL DAY DESPITE ENCOURAGEMENT TO EAT AND DRINK. SHE IS ABLE TO
STAND/PIVOT TO CHAIR/BSC WITH MINIMAL ASSISTANCE AND VERBAL CUES. SHE IS
FORGETFUL AND GETS MIXED UP WITH SITUATION AND PEOPLE, BUT REDIRECTABLE AND
CALM. BED AND CHAIR ALARMS USED. BED LOW, CALL LIGHT IN REACH. STAPLES C,D,I
TO R HIP AREA, INCISION OPEN TO AIR. C/O TENDERNES AND PAIN TO ARMS AND RIGHT
LEG WHEN SHE IS REPOSITIONING AND MOVING.

## 2023-09-30 NOTE — NUR
PATIENT WITH PAIN AND RESTLESS AT BEGINING OF SHIFT. REMOVING HER OXYGEN FROM
HER NOSE AND REQUIRING FREQUENT REDIRECTION. TYLENOL HAD MINIMAL EFFECT ON
PAIN, PATIENT ALSO MEDICATED WIH OXYCODONE WHICH IMPROVED HER HIP PAIN. SHE IS
RESTING IN BED MOST OF SHIFT, UP ONCE TO USE COMMODE STAND PIVOT 2 PERSON
ASSIST WITH 1 MEDIUM BM AND URINARY OUTPUT. BED ALARM ON. CALL LIGHT IN REACH.
PATIENT DOES NOT USE CALL LIGHT, RATHER JUST CALLS OUT FOR HELP.

## 2023-09-30 NOTE — NUR
RN NOTE
MS SOFIA HAS BEEN VERY SLEEPY TODAY. SHE TOOK MEDS CRUSHED IN PUDDING WITH
A LOT OF ENCOURAGEMENT BUT DID NOT EAT OR DRINK ANYTHINK FROM BREAKFAST TRAY.
SHE WAS ABLE TO STAND AND PIVOT TO BEDSIDE COMMODE WITH GAIT BELT, WALKER AND
1 PERSON ASSIST, WAS INCONTINENT OF URINE, DID NOT VOID ON BSC. S/B DR SANDERSON,
PT SLEEPY AND CONFUSED, MD NOTIFIED OF LACK OF PO INTAKE. PALLIATIVE CARE ARE
FOLLOWING PT, VM LEFT. BED LOW, CALL LIGHT IN REACH, BED ALARM ON.

## 2023-10-01 VITALS — DIASTOLIC BLOOD PRESSURE: 64 MMHG | SYSTOLIC BLOOD PRESSURE: 115 MMHG

## 2023-10-01 VITALS — DIASTOLIC BLOOD PRESSURE: 57 MMHG | SYSTOLIC BLOOD PRESSURE: 156 MMHG

## 2023-10-01 VITALS — DIASTOLIC BLOOD PRESSURE: 57 MMHG | SYSTOLIC BLOOD PRESSURE: 104 MMHG

## 2023-10-01 LAB
ANION GAP SERPL CALCULATED.4IONS-SCNC: 2 MMOL/L (ref 6–16)
BUN SERPL-MCNC: 31 MG/DL (ref 8–24)
CALCIUM SERPL-MCNC: 9.4 MG/DL (ref 8.5–10.1)
CHLORIDE SERPL-SCNC: 99 MMOL/L (ref 98–108)
CO2 SERPL-SCNC: 35 MMOL/L (ref 21–32)
CREAT SERPL-MCNC: 0.67 MG/DL (ref 0.4–1)
DEPRECATED RDW RBC AUTO: 51.6 FL (ref 35.1–46.3)
ERYTHROCYTE [DISTWIDTH] IN BLOOD BY AUTOMATED COUNT: 15.3 % (ref 11.7–14.2)
GLUCOSE SERPL-MCNC: 118 MG/DL (ref 70–99)
HCT VFR BLD AUTO: 30.4 % (ref 33–51)
HGB BLD-MCNC: 9.7 G/DL (ref 11.5–16)
MCHC RBC AUTO-ENTMCNC: 31.9 G/DL (ref 31.5–36.5)
MCV RBC AUTO: 92 FL (ref 80–100)
NRBC # BLD AUTO: 0 K/MM3 (ref 0–0.02)
NRBC BLD AUTO-RTO: 0 /100 WBC (ref 0–0.2)
PLATELET # BLD AUTO: 497 K/MM3 (ref 150–400)
POTASSIUM SERPL-SCNC: 3.7 MMOL/L (ref 3.5–5.5)
SODIUM SERPL-SCNC: 136 MMOL/L (ref 136–145)

## 2023-10-01 NOTE — NUR
Pt laying in bed confused, calling out, attempting to get oob, bed alarm is
activated, follows some commands, lungs are clear t/o, a bit dim in bases,
resp even and unlabored, no cough noted, on 2 liters which is her baseline,
but removes periodically, hrr, no edema noted, ppp+1, cap refill<3sec, vs
stable, afebrile, btx4, abd flat soft nontender, voids via bsc and has briefs
in place, skin has bruising to right hip and pelvis, mikhail, one person assist
to bsc, call light in reach.

## 2023-10-01 NOTE — NUR
SHIFT SUMMARY
PATIENT RESTLESS AND UP THROUGHOUT THE NIGHT UNTIL APPROX 4:00AM. SHE WAS UP
TO THE [facility] X2 WITH STAND PIVOT 2 PERSON ASSIST. PATIENT CALLED OUT THROUGH THE
NIGHT AND C/O PAIN IN RIGHT HIP. MEDICATED PER EMAR. BED IN LOW POSITION, CALL
LIGHT IN REACH. PATIENT YELLS OUT AND DOES NOT USE CALL LIGHT.

## 2023-10-01 NOTE — NUR
pt has been busy, calling out frequently and getting up to bsc every ten
minutes for a while, states she wants someone in the room to visit, brought
her out in the manley so she can see people, eating her dinner in her recliner.

## 2023-10-02 VITALS — DIASTOLIC BLOOD PRESSURE: 57 MMHG | SYSTOLIC BLOOD PRESSURE: 128 MMHG

## 2023-10-02 VITALS — DIASTOLIC BLOOD PRESSURE: 84 MMHG | SYSTOLIC BLOOD PRESSURE: 96 MMHG

## 2023-10-02 VITALS — SYSTOLIC BLOOD PRESSURE: 99 MMHG | DIASTOLIC BLOOD PRESSURE: 62 MMHG

## 2023-10-02 VITALS — DIASTOLIC BLOOD PRESSURE: 86 MMHG | SYSTOLIC BLOOD PRESSURE: 153 MMHG

## 2023-10-02 NOTE — NUR
Case Conference: Met with MSW for Care Management, she has been unable to
reach pt's daughter for discharge plans. She requested I try to reach her, and
I attempted. I reached her voicemail, and left a message.

## 2023-10-02 NOTE — NUR
SHIFT SUMMARY
PT AxOx1-SELF, SOMETIMES +SURROUNDINGS, BUT EVEN THAT IS COMPROMISED WITH HER
VISION IMPAIRMENT. PT WAS IN POSEY VEST UPON ARRIVAL THIS AM. PT APPEARED
RELAXED, DROWSY AND COMPLIANT FOR A FEW HOURS, THEREFORE THE POSEY WAS
REMOVED. THE PATIENT CONTINUED TO STRUGGLE WITH FOLLOWING DIRECTION AND
REDIRECT WHEN NEEDED. PT HAD DIARRHEA THIS AFTERNOON. PT BECAME VERY INSISTENT
THAT SHE WAS GOING HOME WHILE DISPLAYING TOSSING AND TURNING AND BLE RESTLESS
MOVEMENTS. SHE WAS ALSO SITTING UP AND ATTEMPTING TO GET OOB SEVERAL TIMES
IN THE AFTERNOON. PT WAS MEDICATED PER EMAR WITH, WHICH SEEMED MILDLY
EFFECTIVE.  ORTHO IN FOR EVAL TODAY, STAPLES REMOVED. PT WAS CLEARED FOR
WEIGHT BEARING AS TOLERATED ON RLE RE:  POST OP 9/15/23 R HIP FX. PT RECEIVED
SHOWER THIS SHIFT.  DISCHARGE ORDERED TODAY. UNFORTUNATELY, THE DAUGHTER,
JULIA, CONTESTED THE DC.  TO FOLLOW UP TOMORROW. PT CURRENTLY
RESTING IN BED WITH CALL LIGHT IN REACH. SLEEPING AT THIS TIME, APPEARING
COMFORTABLE.

## 2023-10-02 NOTE — NUR
PATIENT PROFOUNDLY CONFUSED AND IMPULSIVE ALL NIGHT DESPITE MULTIPLE
MEDICAATIONS TO HELP CALM AND DE STRESS THE PATIENT.  KEKE VEST PLACED AT
2030 FOR PATIENT SAFETY AS SHE WAS OOB EVERY 2-3 MINUTES AND UNWILLING TO
ACCEPT HELP OR EVEN USE FWW.  AFTER PLACEMENT OF VEST, PATIENT YELLED
CONTINIUOUSLY FOR THE REMAINDER OF THE SHIFT.  HALLUCINATING  THAT HER
DAUGHTER JONATHAN WAS IN THE ROOM (NO COMPANY OVERNIGHT)IN ADDITION TO BEDTIME
ORAL ZYPREXA AND REMERON, AN ADDITIONAL DOSE OF IM ZYPREXA AS WELL AS A
10MG DOSE OF IM GEODON DID NOT EVEN SLOW KAM ENOUGHT TO RELAX.
INDIRA RIGHT HIP AREA SLIGHTLY RED FROM PATIENT SCRATCHING AND MOVING.
ATTEMPTED MEPILEX, BUT IT WAS PULLED OFF.  AREA CLEANSED AND LOTION APPLIED.

## 2023-10-02 NOTE — NUR
FAMILY DISCUSSION RE: DISCHARGE
AFTER RECEIVING DC ORDERS FOR PT TODAY, THIS RN ATTEMPTED TO CALL JULIA,
PT'S DAUGHTER WITH WHOM SHE WAS PREVIOUSLY LIVING WITH. PT'S DAUGHTER DID NOT
ANSWER PHONE x2. ON 3RD ATTEMPT, PT'S SON, TALITA ANSWERED JULIA'S PHONE
EXPLAINING THAT JULIA IS ILL AND WILL BE UNABLE TO CARE FOR THEIR MOTHER ANY
LONGER. TALITA GAVE CONTACT INFO FOR A FAMILY FRIEND, RAFAELA RICH,
[phone] WITH A VERBAL PERMISSION THAT SHE BE CONTACTED FOR ALL FURTHER
DECISIONS REGARDING THE PATIENT'S CARE. INFO FORWARDED TO . PT DC
ON HOLD FOR THE TIME BEING.

## 2023-10-03 VITALS — DIASTOLIC BLOOD PRESSURE: 65 MMHG | SYSTOLIC BLOOD PRESSURE: 140 MMHG

## 2023-10-03 VITALS — DIASTOLIC BLOOD PRESSURE: 54 MMHG | SYSTOLIC BLOOD PRESSURE: 151 MMHG

## 2023-10-03 VITALS — DIASTOLIC BLOOD PRESSURE: 50 MMHG | SYSTOLIC BLOOD PRESSURE: 120 MMHG

## 2023-10-03 VITALS — SYSTOLIC BLOOD PRESSURE: 116 MMHG | DIASTOLIC BLOOD PRESSURE: 64 MMHG

## 2023-10-03 NOTE — NUR
PT IS ALERT ORIENTED TO SELF. PT HAS BEEN CONFUSED BUT EASILY REDIRECTED.  PT
IS UP WITH MINIMAL ASSIST TO THE CHAIR AND THE BATHROOM USEING THE GAITE BELT
AND FWW. THE PT APPEARS TO BE BREATHING EASILY ON RA AT THIS TIME. THE PT WAS
DROWSY DURING BREAKFAST AND ONLY ATE A FEW BITES. PT WAS FULLY AWAKE AT LUNCH
WAS ASSISTED TO THE CHAIR AND WAS ABLE TO EAT A SMALL AMOUNT OF THE FINGER
FOODS. PT WAS TAKEN OUT OF THE VEST RESTRAINT THIS AM. CALL LIGHT IN REAC. BED
IN THE LOW [place]

## 2023-10-03 NOTE — NUR
SHIFT SUMMARY. PT HAS BEEN AOX1, VERY RESTLESS, IMPULSIVE THROUGHOUT SHIFT.
KEKE VEST WAS PLACED FOR PT SAFETY JUST BEFORE SHIFT CHANGE. CALLED
HOSPITALIST AFTER SHIFT REPORT AND ACQUIRED ORDER FOR POSEY RESTRAINT DUE TO
UNSAFE BEHAVIOR/IMPULSIVE ATTMEMPTS OOB. PT WAS ABLE TO RELAX FOR A WHILE AT
THE BEGINNING OF SHIFT BUT HAS BECOME INCREASINGLY CONFUSED, RESTLESS, AND
IMPULSIVE AS THE SHIFT HAS GONE ONE. VERY FREQUENTLY CALLING OUT INTO TRAN,
APPEARING TO HALLUCINATE. REPORTS SEEING ANIMALS RUN INTO HER BATHROOM THAT
SHE HAS TO GET OUT OF BED TO COLLECT. ONLY OVER THE PAST 40 MINUTES OR SO HAS
PT SLEPT AT ALL THIS SHIFT. NO PAIN REPORTED THIS SHIFT. HAS BEEN SATTING WELL
ON ROOM AIR AS SHE WOULD NOT TOLERATE NC OR LEAVE IT IN PLACE. APPEARS TO BE
VISUALLY IMPAIRED AS SHE IS A LITTLE OFF WHEN REACHING FOR THINGS AND CAN NOT
CONSISTENTLY TELL ME HOW MANY FINGERS I AM HOLDING UP WHEN I HAVE MY HAND ~3
FEET IN FRONT OF HER. DESPITE CONFUSION, HAS BEEN MOSTLY PLEASANT AND HAS NOT
BEEN COMBATIVE OR OVERLY AGITATED WITH STAFF. USES CALL LIGHT SPORADICALLY.
BED LOCKED IN LOWEST POSITION. CALL LIGHT LEFT WITHIN REACH.

## 2023-10-04 VITALS — DIASTOLIC BLOOD PRESSURE: 50 MMHG | SYSTOLIC BLOOD PRESSURE: 127 MMHG

## 2023-10-04 VITALS — SYSTOLIC BLOOD PRESSURE: 100 MMHG | DIASTOLIC BLOOD PRESSURE: 57 MMHG

## 2023-10-04 NOTE — NUR
SHIFT SUMMARY. PT REMAINS AOX1, PROFOUNDLY CONFUSED, APPEARING TO HALLUCINATE
AT TIMES. DESPITE THIS, PT HAS REMAINED PLEASANT THROUGHOUT SHIFT AND HAS NOT
BECOME IRRITATED/AGITATED WITH CARE. CONTINUES TO FREQUENTLY ATTEMPT OOB
IMPULSIVELY BUT MOVES SLOW AND STAFF ARE ABLE TO REPOSITION PATIENT AND
REDIRECT TO MAINTAIN PATIENT SAFETY. REMAINS DIFFICULT TO REDIRECT BUT IS ABLE
TO REST FOR EXTENDED PERIODS OF TIME BETWEEN REDIRECTION. BED ALARM REMAINS IN
PLACE FOR SAFETY. INCONTINENT THIS SHIFT. SOMEWHAT MORE ANXIOUS THIS SHIFT
THAN PREVIOUS SHIFT WITH THIS NURSE WITH PATIENT REPORTING BEING AFRAID OF
DEATH EARLY IN SHIFT. SINCE THIS TIME, PT REMAINS ANXIOUS BUT NONE MORESO THAN
PREVIOUS SHIFT. SATTING WELL ON ROOM AIR. BED LOCKED IN LOWEST POSITION.CALL
LIGHT LEFT WITHIN REACH.

## 2023-10-04 NOTE — NUR
PT DISCHARGED
THE PTS FAMILY VERBALIZED UNDERSTANDING OF THE DC INSTRUCTIONS. PT
PRESCRIPTIONS REFAXED TO [facility] AND HARD COPY PRESCRIPTION GIVEN TO TE PTS
DAUGHTER. THE PT WAS TRANSFERED VIA WHEELCHAIR ACCOMPANIED BY THE CNA AND HER
FAMILY. BELONGINS RELEASED TO THE FAMILY